# Patient Record
Sex: FEMALE | Race: WHITE | Employment: UNEMPLOYED | URBAN - METROPOLITAN AREA
[De-identification: names, ages, dates, MRNs, and addresses within clinical notes are randomized per-mention and may not be internally consistent; named-entity substitution may affect disease eponyms.]

---

## 2021-03-14 ENCOUNTER — HOSPITAL ENCOUNTER (EMERGENCY)
Age: 51
Discharge: HOME | End: 2021-03-14
Payer: COMMERCIAL

## 2021-03-14 ENCOUNTER — APPOINTMENT (OUTPATIENT)
Dept: GENERAL RADIOLOGY | Age: 51
End: 2021-03-14
Payer: COMMERCIAL

## 2021-03-14 VITALS
SYSTOLIC BLOOD PRESSURE: 125 MMHG | OXYGEN SATURATION: 96 % | BODY MASS INDEX: 17.93 KG/M2 | HEART RATE: 58 BPM | TEMPERATURE: 97.1 F | DIASTOLIC BLOOD PRESSURE: 73 MMHG | RESPIRATION RATE: 16 BRPM | WEIGHT: 105 LBS | HEIGHT: 64 IN

## 2021-03-14 DIAGNOSIS — M25.512 ACUTE PAIN OF LEFT SHOULDER: ICD-10-CM
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
## 2021-03-14 PROCEDURE — 99284 EMERGENCY DEPT VISIT MOD MDM: CPT

## 2021-03-14 PROCEDURE — 73030 X-RAY EXAM OF SHOULDER: CPT

## 2021-03-14 PROCEDURE — 6370000000 HC RX 637 (ALT 250 FOR IP): Performed by: PHYSICIAN ASSISTANT

## 2021-03-14 PROCEDURE — 73060 X-RAY EXAM OF HUMERUS: CPT

## 2021-03-14 RX ORDER — OXYCODONE HYDROCHLORIDE AND ACETAMINOPHEN 5; 325 MG/1; MG/1
1 TABLET ORAL ONCE
Status: COMPLETED | OUTPATIENT
Start: 2021-03-14 | End: 2021-03-14

## 2021-03-14 RX ORDER — HYDROCODONE BITARTRATE AND ACETAMINOPHEN 5; 325 MG/1; MG/1
1 TABLET ORAL EVERY 6 HOURS PRN
Qty: 12 TABLET | Refills: 0 | Status: SHIPPED | OUTPATIENT
Start: 2021-03-14 | End: 2021-03-17

## 2021-03-14 RX ORDER — IBUPROFEN 600 MG/1
600 TABLET ORAL EVERY 6 HOURS PRN
Qty: 40 TABLET | Refills: 0 | Status: SHIPPED | OUTPATIENT
Start: 2021-03-14 | End: 2021-05-21 | Stop reason: ALTCHOICE

## 2021-03-14 RX ADMIN — OXYCODONE HYDROCHLORIDE AND ACETAMINOPHEN 1 TABLET: 5; 325 TABLET ORAL at 15:04

## 2021-03-14 ASSESSMENT — PAIN DESCRIPTION - PAIN TYPE: TYPE: ACUTE PAIN

## 2021-03-14 ASSESSMENT — PAIN SCALES - GENERAL
PAINLEVEL_OUTOF10: 10
PAINLEVEL_OUTOF10: 10

## 2021-03-14 ASSESSMENT — PAIN DESCRIPTION - LOCATION: LOCATION: SHOULDER

## 2021-03-14 ASSESSMENT — PAIN DESCRIPTION - ORIENTATION: ORIENTATION: LEFT

## 2021-03-14 NOTE — ED PROVIDER NOTES
[facility]                                                                                                                                    Department of Emergency Medicine   ED  Provider Note  Admit Date/RoomTime: 3/14/2021  2:31 PM  ED Room: 32/32        HPI:  3/14/21,   Time: 3:00 PM EDT         Karlo Knott is a 45 y.o. female presenting to the ED for fall with left shoulder pain/injury, beginning 1 day ago. The complaint has been persistent, moderate in severity, and worsened by movement of left shoulder. The patient states she is in the process of moving to the area from [place]. She states that there is boxes everywhere and they have been busy unpacking. Last evening she states that she tripped over a box and landed on her left shoulder. The patient complains of severe pain at the site. She states that 6+ years ago she had surgery on that same shoulder in [place]. She reports that there is pins and screws in the joint. The patient reports severe pain. She states that she cannot move the shoulder at all without significant distress. Any movement aggravates her pain. She denies hitting her head or losing consciousness. Denies neck pain. No reported pain in the left elbow or wrist.  Denies numbness or tingling. Patient states she is not on any chronic pain meds.         ROS:     Constitutional: Negative for fever and chills  HENT: Negative for ear pain, sore throat and sinus pressure  Eyes: Negative for pain, discharge and redness  Respiratory:  Negative for shortness of breath, cough and wheezing  Cardiovascular: Negative for CP, edema or palpitations  Gastrointestinal: Negative for nausea, vomiting, diarrhea and abdominal distention  Genitourinary: Negative for dysuria and frequency  Musculoskeletal:  See HPI  Skin: Negative for rash and wound  Neurological: Negative for weakness and headaches  Hematological: Negative for adenopathy    All other systems reviewed and are negative      -------------------------------- PAST HISTORY ----------------------------------  Past Medical History:  has a past medical history of Cirrhosis ([address] Past Surgical History:  has a past surgical history that includes joint replacement (Left). Social History:  reports that she has been smoking. She has been smoking about 0.25 packs per day. She has never used smokeless tobacco. She reports current alcohol use of about 2.0 standard drinks of alcohol per week. She reports current drug use. Drug: Marijuana. Family History: family history is not on file. The patients home medications have been reviewed. Allergies: Patient has no known allergies. --------------------------------- RESULTS ------------------------------------------  All laboratory and radiology results have been personally reviewed by myself   LABS:  No results found for this visit on 03/14/21. RADIOLOGY:  Interpreted by Radiologist.  XR SHOULDER LEFT (MIN 2 VIEWS)   Final Result   No evidence of acute bony or surgical hardware complications. Subacromial ossifications may be related to chronic inflammatory process or   from prior trauma. XR HUMERUS LEFT (MIN 2 VIEWS)   Final Result   No acute bony pathology or surgical hardware complications.             ----------------- NURSING NOTES AND VITALS REVIEWED ---------------   The nursing notes within the ED encounter and vital signs as below have been reviewed. /73   Pulse 58   Temp 97.1 °F (36.2 °C) (Temporal)   Resp 16   Ht 5' 4\" (1.626 m)   Wt 105 lb (47.6 kg)   SpO2 96%   BMI 18.02 kg/m²   Oxygen Saturation Interpretation: Normal      --------------------------------PHYSICAL EXAM------------------------------------      Constitutional/General: Alert and oriented x3, tearful and upset.   Mild/moderate distress  Head: NC/AT  Eyes: PERRL, EOMI  Mouth: Oropharynx clear, handling secretions, no trismus  Neck: Supple, full ROM, no meningeal signs  Pulmonary: Lungs clear to auscultation bilaterally, no wheezes, rales, or rhonchi. Not in respiratory distress  Cardiovascular:  Regular rate and rhythm, no murmurs, gallops, or rubs. 2+ distal pulses  Extremities: Moves all extremities x 3. Exam of left UE negative for obvious trauma, deformity or erythema. She is markedly tender over proximal left humerus. Very limited exam secondary to pain. Unable to move shoulder at all without severe distress. No palpable tenderness over left elbow though any movement of left elbow increases pain in shoulder. Warm and well perfused  Skin: warm and dry without rash  Neurologic: GCS 15,  Intact. No focal deficits  Psych: Normal Affect      ------------------------ ED COURSE/MEDICAL DECISION MAKING----------------------  Medications   oxyCODONE-acetaminophen (PERCOCET) 5-325 MG per tablet 1 tablet (1 tablet Oral Given 3/14/21 8574)         Medical Decision Making:    No acute fracture or hardware failure identified. Pt will be placed in sling and advised to F/U with Ortho here locallyi. I'll allow small script for pain meds. She has negative OARRS report here but just moved to area. Counseling: The emergency provider has spoken with the patient and discussed todays results, in addition to providing specific details for the plan of care and counseling regarding the diagnosis and prognosis. Questions are answered at this time and they are agreeable with the plan.      ------------------------ IMPRESSION AND DISPOSITION -------------------------------    IMPRESSION  1. Fall, initial encounter    2.  Acute pain of left shoulder        DISPOSITION  Disposition: Discharge to home  Patient condition is stable                   Donya Contreras PA-C  03/14/21 8737

## 2021-05-21 ENCOUNTER — ANESTHESIA EVENT (OUTPATIENT)
Dept: OPERATING ROOM | Age: 51
DRG: 482 | End: 2021-05-21
Payer: COMMERCIAL

## 2021-05-21 ENCOUNTER — HOSPITAL ENCOUNTER (INPATIENT)
Age: 51
LOS: 3 days | Discharge: HOME | DRG: 482 | End: 2021-05-24
Attending: EMERGENCY MEDICINE | Admitting: INTERNAL MEDICINE
Payer: COMMERCIAL

## 2021-05-21 ENCOUNTER — APPOINTMENT (OUTPATIENT)
Dept: GENERAL RADIOLOGY | Age: 51
DRG: 482 | End: 2021-05-21
Payer: COMMERCIAL

## 2021-05-21 DIAGNOSIS — S72.002A CLOSED LEFT HIP FRACTURE, INITIAL ENCOUNTER (HCC): Primary | ICD-10-CM

## 2021-05-21 PROBLEM — S72.001A CLOSED RIGHT HIP FRACTURE, INITIAL ENCOUNTER (HCC): Status: ACTIVE | Noted: 2021-05-21

## 2021-05-21 PROBLEM — S72.142A CLOSED INTERTROCHANTERIC FRACTURE OF HIP, LEFT, INITIAL ENCOUNTER (HCC): Status: ACTIVE | Noted: 2021-05-21

## 2021-05-21 LAB
ABO/RH: NORMAL
ANION GAP SERPL CALCULATED.3IONS-SCNC: 11 MMOL/L (ref 7–16)
ANTIBODY SCREEN: NORMAL
BASOPHILS ABSOLUTE: 0.04 E9/L (ref 0–0.2)
BASOPHILS RELATIVE PERCENT: 0.9 % (ref 0–2)
BUN BLDV-MCNC: 7 MG/DL (ref 6–20)
CALCIUM SERPL-MCNC: 8.8 MG/DL (ref 8.6–10.2)
CHLORIDE BLD-SCNC: 106 MMOL/L (ref 98–107)
CO2: 25 MMOL/L (ref 22–29)
CREAT SERPL-MCNC: 0.5 MG/DL (ref 0.5–1)
EKG ATRIAL RATE: 71 BPM
EKG P AXIS: -3 DEGREES
EKG P-R INTERVAL: 126 MS
EKG Q-T INTERVAL: 434 MS
EKG QRS DURATION: 82 MS
EKG QTC CALCULATION (BAZETT): 471 MS
EKG R AXIS: 7 DEGREES
EKG T AXIS: 8 DEGREES
EKG VENTRICULAR RATE: 71 BPM
EOSINOPHILS ABSOLUTE: 0.06 E9/L (ref 0.05–0.5)
EOSINOPHILS RELATIVE PERCENT: 1.3 % (ref 0–6)
GFR AFRICAN AMERICAN: >60
GFR NON-AFRICAN AMERICAN: >60 ML/MIN/1.73
GLUCOSE BLD-MCNC: 78 MG/DL (ref 74–99)
HCT VFR BLD CALC: 36.8 % (ref 34–48)
HEMOGLOBIN: 12.5 G/DL (ref 11.5–15.5)
IMMATURE GRANULOCYTES #: 0.01 E9/L
IMMATURE GRANULOCYTES %: 0.2 % (ref 0–5)
LYMPHOCYTES ABSOLUTE: 1.39 E9/L (ref 1.5–4)
LYMPHOCYTES RELATIVE PERCENT: 30.5 % (ref 20–42)
MCH RBC QN AUTO: 35.9 PG (ref 26–35)
MCHC RBC AUTO-ENTMCNC: 34 % (ref 32–34.5)
MCV RBC AUTO: 105.7 FL (ref 80–99.9)
MONOCYTES ABSOLUTE: 0.46 E9/L (ref 0.1–0.95)
MONOCYTES RELATIVE PERCENT: 10.1 % (ref 2–12)
NEUTROPHILS ABSOLUTE: 2.59 E9/L (ref 1.8–7.3)
NEUTROPHILS RELATIVE PERCENT: 57 % (ref 43–80)
PDW BLD-RTO: 14.6 FL (ref 11.5–15)
PLATELET # BLD: 104 E9/L (ref 130–450)
PMV BLD AUTO: 10.4 FL (ref 7–12)
POTASSIUM SERPL-SCNC: 3.9 MMOL/L (ref 3.5–5)
RBC # BLD: 3.48 E12/L (ref 3.5–5.5)
SODIUM BLD-SCNC: 142 MMOL/L (ref 132–146)
WBC # BLD: 4.6 E9/L (ref 4.5–11.5)

## 2021-05-21 PROCEDURE — 86900 BLOOD TYPING SEROLOGIC ABO: CPT

## 2021-05-21 PROCEDURE — 73502 X-RAY EXAM HIP UNI 2-3 VIEWS: CPT

## 2021-05-21 PROCEDURE — 99253 IP/OBS CNSLTJ NEW/EST LOW 45: CPT | Performed by: ORTHOPAEDIC SURGERY

## 2021-05-21 PROCEDURE — 86901 BLOOD TYPING SEROLOGIC RH(D): CPT

## 2021-05-21 PROCEDURE — 86850 RBC ANTIBODY SCREEN: CPT

## 2021-05-21 PROCEDURE — 93010 ELECTROCARDIOGRAM REPORT: CPT | Performed by: INTERNAL MEDICINE

## 2021-05-21 PROCEDURE — 2580000003 HC RX 258: Performed by: ORTHOPAEDIC SURGERY

## 2021-05-21 PROCEDURE — 85025 COMPLETE CBC W/AUTO DIFF WBC: CPT

## 2021-05-21 PROCEDURE — 80048 BASIC METABOLIC PNL TOTAL CA: CPT

## 2021-05-21 PROCEDURE — 96374 THER/PROPH/DIAG INJ IV PUSH: CPT

## 2021-05-21 PROCEDURE — 93005 ELECTROCARDIOGRAM TRACING: CPT | Performed by: EMERGENCY MEDICINE

## 2021-05-21 PROCEDURE — 6360000002 HC RX W HCPCS: Performed by: ORTHOPAEDIC SURGERY

## 2021-05-21 PROCEDURE — 6360000002 HC RX W HCPCS: Performed by: EMERGENCY MEDICINE

## 2021-05-21 PROCEDURE — 1200000000 HC SEMI PRIVATE

## 2021-05-21 PROCEDURE — 99283 EMERGENCY DEPT VISIT LOW MDM: CPT

## 2021-05-21 PROCEDURE — 6370000000 HC RX 637 (ALT 250 FOR IP): Performed by: ORTHOPAEDIC SURGERY

## 2021-05-21 RX ORDER — FENTANYL CITRATE 50 UG/ML
25 INJECTION, SOLUTION INTRAMUSCULAR; INTRAVENOUS ONCE
Status: COMPLETED | OUTPATIENT
Start: 2021-05-21 | End: 2021-05-21

## 2021-05-21 RX ORDER — SODIUM CHLORIDE 0.9 % (FLUSH) 0.9 %
10 SYRINGE (ML) INJECTION PRN
Status: DISCONTINUED | OUTPATIENT
Start: 2021-05-21 | End: 2021-05-21

## 2021-05-21 RX ORDER — OXYCODONE HYDROCHLORIDE 5 MG/1
5 TABLET ORAL EVERY 4 HOURS PRN
Status: DISCONTINUED | OUTPATIENT
Start: 2021-05-21 | End: 2021-05-24 | Stop reason: HOSPADM

## 2021-05-21 RX ORDER — PROMETHAZINE HYDROCHLORIDE 25 MG/1
12.5 TABLET ORAL EVERY 6 HOURS PRN
Status: DISCONTINUED | OUTPATIENT
Start: 2021-05-21 | End: 2021-05-24 | Stop reason: HOSPADM

## 2021-05-21 RX ORDER — OXYCODONE HYDROCHLORIDE 5 MG/1
10 TABLET ORAL EVERY 4 HOURS PRN
Status: DISCONTINUED | OUTPATIENT
Start: 2021-05-21 | End: 2021-05-24 | Stop reason: HOSPADM

## 2021-05-21 RX ORDER — TRAZODONE HYDROCHLORIDE 150 MG/1
150 TABLET ORAL NIGHTLY
COMMUNITY

## 2021-05-21 RX ORDER — ONDANSETRON 2 MG/ML
4 INJECTION INTRAMUSCULAR; INTRAVENOUS EVERY 6 HOURS PRN
Status: DISCONTINUED | OUTPATIENT
Start: 2021-05-21 | End: 2021-05-24 | Stop reason: HOSPADM

## 2021-05-21 RX ORDER — SODIUM CHLORIDE 0.9 % (FLUSH) 0.9 %
5-40 SYRINGE (ML) INJECTION PRN
Status: DISCONTINUED | OUTPATIENT
Start: 2021-05-21 | End: 2021-05-24 | Stop reason: HOSPADM

## 2021-05-21 RX ORDER — MORPHINE SULFATE 4 MG/ML
4 INJECTION, SOLUTION INTRAMUSCULAR; INTRAVENOUS ONCE
Status: COMPLETED | OUTPATIENT
Start: 2021-05-21 | End: 2021-05-21

## 2021-05-21 RX ORDER — MORPHINE SULFATE 4 MG/ML
4 INJECTION, SOLUTION INTRAMUSCULAR; INTRAVENOUS
Status: DISCONTINUED | OUTPATIENT
Start: 2021-05-21 | End: 2021-05-24 | Stop reason: HOSPADM

## 2021-05-21 RX ORDER — BUPROPION HYDROCHLORIDE 150 MG/1
150 TABLET ORAL EVERY MORNING
COMMUNITY
End: 2021-11-08 | Stop reason: ALTCHOICE

## 2021-05-21 RX ORDER — SODIUM CHLORIDE 9 MG/ML
25 INJECTION, SOLUTION INTRAVENOUS PRN
Status: DISCONTINUED | OUTPATIENT
Start: 2021-05-21 | End: 2021-05-24 | Stop reason: HOSPADM

## 2021-05-21 RX ORDER — POLYETHYLENE GLYCOL 3350 17 G/17G
17 POWDER, FOR SOLUTION ORAL DAILY PRN
Status: DISCONTINUED | OUTPATIENT
Start: 2021-05-21 | End: 2021-05-24 | Stop reason: HOSPADM

## 2021-05-21 RX ORDER — MORPHINE SULFATE 2 MG/ML
2 INJECTION, SOLUTION INTRAMUSCULAR; INTRAVENOUS
Status: DISCONTINUED | OUTPATIENT
Start: 2021-05-21 | End: 2021-05-24 | Stop reason: HOSPADM

## 2021-05-21 RX ORDER — SODIUM CHLORIDE 0.9 % (FLUSH) 0.9 %
5-40 SYRINGE (ML) INJECTION EVERY 12 HOURS SCHEDULED
Status: DISCONTINUED | OUTPATIENT
Start: 2021-05-21 | End: 2021-05-22 | Stop reason: SDUPTHER

## 2021-05-21 RX ADMIN — MORPHINE SULFATE 4 MG: 4 INJECTION, SOLUTION INTRAMUSCULAR; INTRAVENOUS at 21:40

## 2021-05-21 RX ADMIN — ENOXAPARIN SODIUM 40 MG: 40 INJECTION SUBCUTANEOUS at 15:21

## 2021-05-21 RX ADMIN — OXYCODONE HYDROCHLORIDE 10 MG: 5 TABLET ORAL at 15:21

## 2021-05-21 RX ADMIN — MORPHINE SULFATE 4 MG: 4 INJECTION, SOLUTION INTRAMUSCULAR; INTRAVENOUS at 18:48

## 2021-05-21 RX ADMIN — MORPHINE SULFATE 4 MG: 4 INJECTION, SOLUTION INTRAMUSCULAR; INTRAVENOUS at 16:32

## 2021-05-21 RX ADMIN — Medication 10 ML: at 21:00

## 2021-05-21 RX ADMIN — FENTANYL CITRATE 25 MCG: 50 INJECTION, SOLUTION INTRAMUSCULAR; INTRAVENOUS at 12:47

## 2021-05-21 RX ADMIN — MORPHINE SULFATE 4 MG: 4 INJECTION, SOLUTION INTRAMUSCULAR; INTRAVENOUS at 13:45

## 2021-05-21 ASSESSMENT — ENCOUNTER SYMPTOMS
SINUS PRESSURE: 0
BACK PAIN: 0
EYE PAIN: 0
EYE DISCHARGE: 0
DIARRHEA: 0
COUGH: 0
EYE REDNESS: 0
ABDOMINAL DISTENTION: 0
VOMITING: 0
WHEEZING: 0
SORE THROAT: 0
NAUSEA: 0
SHORTNESS OF BREATH: 0
SHORTNESS OF BREATH: 0

## 2021-05-21 ASSESSMENT — PAIN DESCRIPTION - PAIN TYPE
TYPE: ACUTE PAIN

## 2021-05-21 ASSESSMENT — PAIN DESCRIPTION - ORIENTATION
ORIENTATION: LEFT
ORIENTATION: RIGHT

## 2021-05-21 ASSESSMENT — PAIN SCALES - GENERAL
PAINLEVEL_OUTOF10: 10
PAINLEVEL_OUTOF10: 4
PAINLEVEL_OUTOF10: 10

## 2021-05-21 ASSESSMENT — PAIN DESCRIPTION - DESCRIPTORS: DESCRIPTORS: SHARP;RADIATING

## 2021-05-21 ASSESSMENT — PAIN DESCRIPTION - LOCATION: LOCATION: HIP

## 2021-05-21 ASSESSMENT — PAIN DESCRIPTION - FREQUENCY: FREQUENCY: CONTINUOUS

## 2021-05-21 NOTE — PLAN OF CARE
Problem: Pain:  Goal: Pain level will decrease  Description: Pain level will decrease  Outcome: Met This Shift     Problem: Pain - Acute:  Goal: Pain level will decrease  Description: Pain level will decrease  Outcome: Met This Shift     Problem: Pain:  Goal: Control of acute pain  Description: Control of acute pain  Outcome: Ongoing  Goal: Control of chronic pain  Description: Control of chronic pain  Outcome: Ongoing     Problem: Skin Integrity:  Goal: Will show no infection signs and symptoms  Description: Will show no infection signs and symptoms  Outcome: Ongoing  Goal: Absence of new skin breakdown  Description: Absence of new skin breakdown  Outcome: Ongoing     Problem: Falls - Risk of:  Goal: Will remain free from falls  Description: Will remain free from falls  Outcome: Ongoing  Goal: Absence of physical injury  Description: Absence of physical injury  Outcome: Ongoing     Problem: Discharge Planning:  Goal: Knowledge of discharge instructions  Description: Knowledge of discharge instructions  Outcome: Ongoing     Problem: Infection - Surgical Site:  Goal: Signs of wound healing will improve  Description: Signs of wound healing will improve  Outcome: Ongoing     Problem: Mobility - Impaired:  Goal: Achieve maximum mobility level  Description: Achieve maximum mobility level  Outcome: Ongoing

## 2021-05-21 NOTE — ED NOTES
Bed:  GALAVIZ-  Expected date:   Expected time:   Means of arrival:   Comments:  Reta Maher RN  05/21/21 0153
[facility] faxed, spoke to Birgit Dietz, confirmation received. Pt chimed.       Melo Michele, MATEO  05/21/21 1984
30-Nov-2019 06:34

## 2021-05-21 NOTE — ED PROVIDER NOTES
Head: Normocephalic and atraumatic. Nose: Nose normal.   Eyes:      Extraocular Movements: Extraocular movements intact. Pupils: Pupils are equal, round, and reactive to light. Cardiovascular:      Rate and Rhythm: Normal rate and regular rhythm. Pulses: Normal pulses. Heart sounds: Normal heart sounds. Pulmonary:      Effort: Pulmonary effort is normal.      Breath sounds: Normal breath sounds. Abdominal:      General: Abdomen is flat. Bowel sounds are normal.      Palpations: Abdomen is soft. Musculoskeletal:      Left hip: Deformity, tenderness and bony tenderness present. Neurological:      Mental Status: She is alert. Procedures     MDM  Number of Diagnoses or Management Options  Diagnosis management comments: Patient seen and examined. Patient sent over with evidence of left hip fracture. Spoke with Dr. Radha Mccann who will admit patient consult placed to Dr. Twin Paez for orthopedics. ED Course as of May 21 1317   Fri May 21, 2021   1316 EKG: This EKG is signed and interpreted by the EP. Time: 13:15  Rate: 71  Rhythm: Sinus  Interpretation: NSR  Comparison: was normal      [CF]      ED Course User Index  [CF] Destiny , DO        --------------------------------------------- PAST HISTORY ---------------------------------------------  Past Medical History:  has a past medical history of Cirrhosis ([facility] [address] Past Surgical History:  has a past surgical history that includes joint replacement (Left). Social History:  reports that she has been smoking. She has been smoking about 0.25 packs per day. She has never used smokeless tobacco. She reports current alcohol use of about 2.0 standard drinks of alcohol per week. She reports current drug use. Drug: Marijuana. Family History: family history is not on file. The patients home medications have been reviewed. Allergies: Patient has no known allergies.     -------------------------------------------------- specific details for the plan of care and counseling regarding the diagnosis and prognosis. Their questions are answered at this time and they are agreeable with the plan of admission.    --------------------------------- ADDITIONAL PROVIDER NOTES ---------------------------------  This patient's ED course included: a personal history and physicial examination, re-evaluation prior to disposition, multiple bedside re-evaluations, IV medications, cardiac monitoring and continuous pulse oximetry    This patient has remained hemodynamically stable during their ED course. Diagnosis:  1. Closed left hip fracture, initial encounter [facility])        Disposition:  Patient's disposition: Admit to med/surg floor  Patient's condition is fair.            Eros Mccarty DO  05/21/21 1318

## 2021-05-21 NOTE — ANESTHESIA PRE PROCEDURE
Department of Anesthesiology  Preprocedure Note       Name:  Bibi Gonzalez   Age:  45 y.o.  :  1970                                          MRN:  [id number]         Date:  2021      Surgeon: Celestine Cronin):  Jordan Alegria MD    Procedure: Procedure(s):  LEFT HIP OPEN REDUCTION INTERNAL FIXATION   +++SYNTHES+++  PLATE SCREWS CANNULATED SCREW FEMORAL NECK SYSTEM TROCHANTERIC CLAW PROBABLE SHORT TSN    Medications prior to admission:   Prior to Admission medications    Medication Sig Start Date End Date Taking?  Authorizing Provider   traZODone (DESYREL) 150 MG tablet Take 150 mg by mouth nightly   Yes Historical Provider, MD   buPROPion (WELLBUTRIN XL) 150 MG extended release tablet Take 150 mg by mouth every morning   Yes Historical Provider, MD       Current medications:    Current Facility-Administered Medications   Medication Dose Route Frequency Provider Last Rate Last Admin    sodium chloride flush 0.9 % injection 5-40 mL  5-40 mL Intravenous 2 times per day Jordan Alegria MD        sodium chloride flush 0.9 % injection 5-40 mL  5-40 mL Intravenous PRN Jordan Alegria MD        0.9 % sodium chloride infusion  25 mL Intravenous PRN Jordan Alegria MD        enoxaparin (LOVENOX) injection 40 mg  40 mg Subcutaneous Daily Jordan Alegria MD   40 mg at 21 1521    promethazine (PHENERGAN) tablet 12.5 mg  12.5 mg Oral Q6H PRN Jordan Alegria MD        Or    [facility] injection 4 mg  4 mg Intravenous Q6H PRN Jordan Alegria MD        polyethylene glycol [facility]) packet 17 g  17 g Oral Daily PRN Jordan Alegria MD        ceFAZolin (ANCEF) 2000 mg in sterile water 20 mL IV syringe  2,000 mg Intravenous See Admin Instructions Jordan Alegria MD        oxyCODONE (ROXICODONE) immediate release tablet 5 mg  5 mg Oral Q4H PRN Jordan Alegria MD        Or    oxyCODONE (ROXICODONE) immediate release tablet 10 mg  10 mg Oral Q4H PRN Jordan Alegria MD   10 mg at 21 1521    Lab Results   Component Value Date     05/21/2021    K 3.9 05/21/2021     05/21/2021    CO2 25 05/21/2021    BUN 7 05/21/2021    CREATININE 0.5 05/21/2021    GFRAA >60 05/21/2021    LABGLOM >60 05/21/2021    GLUCOSE 78 05/21/2021    CALCIUM 8.8 05/21/2021       POC Tests: No results for input(s): POCGLU, POCNA, POCK, POCCL, POCBUN, POCHEMO, POCHCT in the last 72 hours. Coags: No results found for: PROTIME, INR, APTT    HCG (If Applicable): No results found for: PREGTESTUR, PREGSERUM, HCG, HCGQUANT     ABGs: No results found for: PHART, PO2ART, JFN1HHC, GPA8NFC, BEART, J6MTCNLM     Type & Screen (If Applicable):  No results found for: LABABO, LABRH    Drug/Infectious Status (If Applicable):  No results found for: HIV, HEPCAB    COVID-19 Screening (If Applicable): No results found for: COVID19        Anesthesia Evaluation  Patient summary reviewed no history of anesthetic complications:   Airway: Mallampati: II  TM distance: >3 FB   Neck ROM: full  Mouth opening: > = 3 FB Dental: normal exam         Pulmonary: breath sounds clear to auscultation  (+) current smoker    (-) shortness of breath                          ROS comment: MARIJUANA USE   Cardiovascular:Negative CV ROS        (-) past MI and CAD    ECG reviewed  Rhythm: regular  Rate: normal                    Neuro/Psych:   Negative Neuro/Psych ROS              GI/Hepatic/Renal:   (+) liver disease (S/P TIPS Procedure): portal hypertension,           Endo/Other:    (+) blood dyscrasia: thrombocytopenia:., .    (-) diabetes mellitus, hypothyroidism               Abdominal:         (-) obese     Vascular: negative vascular ROS. Anesthesia Plan      general     ASA 3       Induction: intravenous. BIS  MIPS: Postoperative opioids intended and Prophylactic antiemetics administered. Anesthetic plan and risks discussed with patient. Use of blood products discussed with patient whom consented to blood products.

## 2021-05-21 NOTE — Clinical Note
Patient Class: Inpatient [101]   REQUIRED: Diagnosis: Closed right hip fracture, initial encounter [facility]) [[id number]]   Estimated Length of Stay: Estimated stay of more than 2 midnights   Admitting Provider: Terri Gallagher [[id number]]   Telemetry/Cardiac Monitoring Required?: No

## 2021-05-22 ENCOUNTER — ANESTHESIA (OUTPATIENT)
Dept: OPERATING ROOM | Age: 51
DRG: 482 | End: 2021-05-22
Payer: COMMERCIAL

## 2021-05-22 ENCOUNTER — APPOINTMENT (OUTPATIENT)
Dept: GENERAL RADIOLOGY | Age: 51
DRG: 482 | End: 2021-05-22
Payer: COMMERCIAL

## 2021-05-22 VITALS — SYSTOLIC BLOOD PRESSURE: 113 MMHG | OXYGEN SATURATION: 94 % | TEMPERATURE: 96.8 F | DIASTOLIC BLOOD PRESSURE: 59 MMHG

## 2021-05-22 PROBLEM — K74.60 CIRRHOSIS (HCC): Status: ACTIVE | Noted: 2021-05-22

## 2021-05-22 LAB
ALBUMIN SERPL-MCNC: 4 G/DL (ref 3.5–5.2)
ALP BLD-CCNC: 169 U/L (ref 35–104)
ALT SERPL-CCNC: 18 U/L (ref 0–32)
ANION GAP SERPL CALCULATED.3IONS-SCNC: 12 MMOL/L (ref 7–16)
ANION GAP SERPL CALCULATED.3IONS-SCNC: 12 MMOL/L (ref 7–16)
AST SERPL-CCNC: 51 U/L (ref 0–31)
BILIRUB SERPL-MCNC: 1.5 MG/DL (ref 0–1.2)
BUN BLDV-MCNC: 7 MG/DL (ref 6–20)
BUN BLDV-MCNC: 7 MG/DL (ref 6–20)
CALCIUM SERPL-MCNC: 9.1 MG/DL (ref 8.6–10.2)
CALCIUM SERPL-MCNC: 9.1 MG/DL (ref 8.6–10.2)
CHLORIDE BLD-SCNC: 96 MMOL/L (ref 98–107)
CHLORIDE BLD-SCNC: 98 MMOL/L (ref 98–107)
CO2: 25 MMOL/L (ref 22–29)
CO2: 25 MMOL/L (ref 22–29)
CREAT SERPL-MCNC: 0.4 MG/DL (ref 0.5–1)
CREAT SERPL-MCNC: 0.4 MG/DL (ref 0.5–1)
GFR AFRICAN AMERICAN: >60
GFR AFRICAN AMERICAN: >60
GFR NON-AFRICAN AMERICAN: >60 ML/MIN/1.73
GFR NON-AFRICAN AMERICAN: >60 ML/MIN/1.73
GLUCOSE BLD-MCNC: 73 MG/DL (ref 74–99)
GLUCOSE BLD-MCNC: 75 MG/DL (ref 74–99)
HCT VFR BLD CALC: 39.9 % (ref 34–48)
HEMOGLOBIN: 12.8 G/DL (ref 11.5–15.5)
INR BLD: 1.3
MCH RBC QN AUTO: 34.3 PG (ref 26–35)
MCHC RBC AUTO-ENTMCNC: 32.1 % (ref 32–34.5)
MCV RBC AUTO: 107 FL (ref 80–99.9)
PDW BLD-RTO: 14 FL (ref 11.5–15)
PLATELET # BLD: 105 E9/L (ref 130–450)
PMV BLD AUTO: 10.2 FL (ref 7–12)
POTASSIUM REFLEX MAGNESIUM: 3.9 MMOL/L (ref 3.5–5)
POTASSIUM SERPL-SCNC: 4 MMOL/L (ref 3.5–5)
PROTHROMBIN TIME: 14.1 SEC (ref 9.3–12.4)
RBC # BLD: 3.73 E12/L (ref 3.5–5.5)
SODIUM BLD-SCNC: 133 MMOL/L (ref 132–146)
SODIUM BLD-SCNC: 135 MMOL/L (ref 132–146)
TOTAL PROTEIN: 7.2 G/DL (ref 6.4–8.3)
WBC # BLD: 5.3 E9/L (ref 4.5–11.5)

## 2021-05-22 PROCEDURE — 6360000002 HC RX W HCPCS: Performed by: ANESTHESIOLOGY

## 2021-05-22 PROCEDURE — 3700000000 HC ANESTHESIA ATTENDED CARE: Performed by: ORTHOPAEDIC SURGERY

## 2021-05-22 PROCEDURE — 80048 BASIC METABOLIC PNL TOTAL CA: CPT

## 2021-05-22 PROCEDURE — 6370000000 HC RX 637 (ALT 250 FOR IP): Performed by: ORTHOPAEDIC SURGERY

## 2021-05-22 PROCEDURE — C1769 GUIDE WIRE: HCPCS | Performed by: ORTHOPAEDIC SURGERY

## 2021-05-22 PROCEDURE — 2709999900 HC NON-CHARGEABLE SUPPLY: Performed by: ORTHOPAEDIC SURGERY

## 2021-05-22 PROCEDURE — 87081 CULTURE SCREEN ONLY: CPT

## 2021-05-22 PROCEDURE — 80053 COMPREHEN METABOLIC PANEL: CPT

## 2021-05-22 PROCEDURE — 3700000001 HC ADD 15 MINUTES (ANESTHESIA): Performed by: ORTHOPAEDIC SURGERY

## 2021-05-22 PROCEDURE — 2720000010 HC SURG SUPPLY STERILE: Performed by: ORTHOPAEDIC SURGERY

## 2021-05-22 PROCEDURE — 7100000000 HC PACU RECOVERY - FIRST 15 MIN: Performed by: ORTHOPAEDIC SURGERY

## 2021-05-22 PROCEDURE — 3209999900 FLUORO FOR SURGICAL PROCEDURES

## 2021-05-22 PROCEDURE — 27245 TREAT THIGH FRACTURE: CPT | Performed by: ORTHOPAEDIC SURGERY

## 2021-05-22 PROCEDURE — 51798 US URINE CAPACITY MEASURE: CPT

## 2021-05-22 PROCEDURE — 0QS704Z REPOSITION LEFT UPPER FEMUR WITH INTERNAL FIXATION DEVICE, OPEN APPROACH: ICD-10-PCS | Performed by: ORTHOPAEDIC SURGERY

## 2021-05-22 PROCEDURE — 2700000000 HC OXYGEN THERAPY PER DAY

## 2021-05-22 PROCEDURE — 85027 COMPLETE CBC AUTOMATED: CPT

## 2021-05-22 PROCEDURE — 85610 PROTHROMBIN TIME: CPT

## 2021-05-22 PROCEDURE — 1200000000 HC SEMI PRIVATE

## 2021-05-22 PROCEDURE — 6360000002 HC RX W HCPCS: Performed by: NURSE ANESTHETIST, CERTIFIED REGISTERED

## 2021-05-22 PROCEDURE — 73501 X-RAY EXAM HIP UNI 1 VIEW: CPT

## 2021-05-22 PROCEDURE — C1713 ANCHOR/SCREW BN/BN,TIS/BN: HCPCS | Performed by: ORTHOPAEDIC SURGERY

## 2021-05-22 PROCEDURE — 51701 INSERT BLADDER CATHETER: CPT

## 2021-05-22 PROCEDURE — C1776 JOINT DEVICE (IMPLANTABLE): HCPCS | Performed by: ORTHOPAEDIC SURGERY

## 2021-05-22 PROCEDURE — 36415 COLL VENOUS BLD VENIPUNCTURE: CPT

## 2021-05-22 PROCEDURE — 6360000002 HC RX W HCPCS: Performed by: ORTHOPAEDIC SURGERY

## 2021-05-22 PROCEDURE — 2500000003 HC RX 250 WO HCPCS: Performed by: NURSE ANESTHETIST, CERTIFIED REGISTERED

## 2021-05-22 PROCEDURE — 2580000003 HC RX 258: Performed by: NURSE ANESTHETIST, CERTIFIED REGISTERED

## 2021-05-22 PROCEDURE — 3600000013 HC SURGERY LEVEL 3 ADDTL 15MIN: Performed by: ORTHOPAEDIC SURGERY

## 2021-05-22 PROCEDURE — 7100000001 HC PACU RECOVERY - ADDTL 15 MIN: Performed by: ORTHOPAEDIC SURGERY

## 2021-05-22 PROCEDURE — 2580000003 HC RX 258: Performed by: ORTHOPAEDIC SURGERY

## 2021-05-22 PROCEDURE — 3600000003 HC SURGERY LEVEL 3 BASE: Performed by: ORTHOPAEDIC SURGERY

## 2021-05-22 DEVICE — SCREW BNE LCK 5X30 MM W/ T25 STARDRV FOR IM NAIL TI STRL: Type: IMPLANTABLE DEVICE | Site: HIP | Status: FUNCTIONAL

## 2021-05-22 DEVICE — SCREW BNE L85MM DIA10.35MM G TI CANN PERF FOR PROX FEM: Type: IMPLANTABLE DEVICE | Site: HIP | Status: FUNCTIONAL

## 2021-05-22 DEVICE — NAIL IM L170MM DIA9MM 125DEG SHT GRN PROX FEM TI: Type: IMPLANTABLE DEVICE | Site: HIP | Status: FUNCTIONAL

## 2021-05-22 RX ORDER — SODIUM CHLORIDE 9 MG/ML
INJECTION, SOLUTION INTRAVENOUS CONTINUOUS PRN
Status: DISCONTINUED | OUTPATIENT
Start: 2021-05-22 | End: 2021-05-22 | Stop reason: SDUPTHER

## 2021-05-22 RX ORDER — TRAZODONE HYDROCHLORIDE 150 MG/1
150 TABLET ORAL NIGHTLY
Status: DISCONTINUED | OUTPATIENT
Start: 2021-05-22 | End: 2021-05-24 | Stop reason: HOSPADM

## 2021-05-22 RX ORDER — PHENYLEPHRINE HCL IN 0.9% NACL 1 MG/10 ML
SYRINGE (ML) INTRAVENOUS PRN
Status: DISCONTINUED | OUTPATIENT
Start: 2021-05-22 | End: 2021-05-22 | Stop reason: SDUPTHER

## 2021-05-22 RX ORDER — GLYCOPYRROLATE 1 MG/5 ML
SYRINGE (ML) INTRAVENOUS PRN
Status: DISCONTINUED | OUTPATIENT
Start: 2021-05-22 | End: 2021-05-22 | Stop reason: SDUPTHER

## 2021-05-22 RX ORDER — FENTANYL CITRATE 50 UG/ML
25 INJECTION, SOLUTION INTRAMUSCULAR; INTRAVENOUS EVERY 5 MIN PRN
Status: DISCONTINUED | OUTPATIENT
Start: 2021-05-22 | End: 2021-05-22 | Stop reason: HOSPADM

## 2021-05-22 RX ORDER — OXYCODONE HYDROCHLORIDE AND ACETAMINOPHEN 5; 325 MG/1; MG/1
1 TABLET ORAL EVERY 6 HOURS PRN
Qty: 28 TABLET | Refills: 0 | Status: SHIPPED | OUTPATIENT
Start: 2021-05-22 | End: 2021-06-04 | Stop reason: SDUPTHER

## 2021-05-22 RX ORDER — SODIUM CHLORIDE 0.9 % (FLUSH) 0.9 %
5-40 SYRINGE (ML) INJECTION EVERY 12 HOURS SCHEDULED
Status: DISCONTINUED | OUTPATIENT
Start: 2021-05-22 | End: 2021-05-24 | Stop reason: HOSPADM

## 2021-05-22 RX ORDER — DEXAMETHASONE SODIUM PHOSPHATE 4 MG/ML
INJECTION, SOLUTION INTRA-ARTICULAR; INTRALESIONAL; INTRAMUSCULAR; INTRAVENOUS; SOFT TISSUE PRN
Status: DISCONTINUED | OUTPATIENT
Start: 2021-05-22 | End: 2021-05-22 | Stop reason: SDUPTHER

## 2021-05-22 RX ORDER — ROCURONIUM BROMIDE 10 MG/ML
INJECTION, SOLUTION INTRAVENOUS PRN
Status: DISCONTINUED | OUTPATIENT
Start: 2021-05-22 | End: 2021-05-22 | Stop reason: SDUPTHER

## 2021-05-22 RX ORDER — MIDAZOLAM HYDROCHLORIDE 1 MG/ML
INJECTION INTRAMUSCULAR; INTRAVENOUS PRN
Status: DISCONTINUED | OUTPATIENT
Start: 2021-05-22 | End: 2021-05-22 | Stop reason: SDUPTHER

## 2021-05-22 RX ORDER — CEFAZOLIN SODIUM 1 G/3ML
INJECTION, POWDER, FOR SOLUTION INTRAMUSCULAR; INTRAVENOUS PRN
Status: DISCONTINUED | OUTPATIENT
Start: 2021-05-22 | End: 2021-05-22 | Stop reason: SDUPTHER

## 2021-05-22 RX ORDER — LIDOCAINE HYDROCHLORIDE 20 MG/ML
INJECTION, SOLUTION EPIDURAL; INFILTRATION; INTRACAUDAL; PERINEURAL PRN
Status: DISCONTINUED | OUTPATIENT
Start: 2021-05-22 | End: 2021-05-22 | Stop reason: SDUPTHER

## 2021-05-22 RX ORDER — ASPIRIN 81 MG/1
81 TABLET ORAL 2 TIMES DAILY
Qty: 56 TABLET | Refills: 0 | Status: SHIPPED | OUTPATIENT
Start: 2021-05-22 | End: 2021-11-08 | Stop reason: ALTCHOICE

## 2021-05-22 RX ORDER — FENTANYL CITRATE 50 UG/ML
INJECTION, SOLUTION INTRAMUSCULAR; INTRAVENOUS PRN
Status: DISCONTINUED | OUTPATIENT
Start: 2021-05-22 | End: 2021-05-22 | Stop reason: SDUPTHER

## 2021-05-22 RX ORDER — NEOSTIGMINE METHYLSULFATE 1 MG/ML
INJECTION, SOLUTION INTRAVENOUS PRN
Status: DISCONTINUED | OUTPATIENT
Start: 2021-05-22 | End: 2021-05-22 | Stop reason: SDUPTHER

## 2021-05-22 RX ORDER — SENNA AND DOCUSATE SODIUM 50; 8.6 MG/1; MG/1
1 TABLET, FILM COATED ORAL 2 TIMES DAILY
Status: DISCONTINUED | OUTPATIENT
Start: 2021-05-22 | End: 2021-05-24 | Stop reason: HOSPADM

## 2021-05-22 RX ORDER — PROPOFOL 10 MG/ML
INJECTION, EMULSION INTRAVENOUS PRN
Status: DISCONTINUED | OUTPATIENT
Start: 2021-05-22 | End: 2021-05-22 | Stop reason: SDUPTHER

## 2021-05-22 RX ADMIN — DOCUSATE SODIUM 50 MG AND SENNOSIDES 8.6 MG 1 TABLET: 8.6; 5 TABLET, FILM COATED ORAL at 22:54

## 2021-05-22 RX ADMIN — MORPHINE SULFATE 4 MG: 4 INJECTION, SOLUTION INTRAMUSCULAR; INTRAVENOUS at 00:47

## 2021-05-22 RX ADMIN — OXYCODONE HYDROCHLORIDE 10 MG: 5 TABLET ORAL at 22:54

## 2021-05-22 RX ADMIN — HYDROMORPHONE HYDROCHLORIDE 0.5 MG: 1 INJECTION, SOLUTION INTRAMUSCULAR; INTRAVENOUS; SUBCUTANEOUS at 18:23

## 2021-05-22 RX ADMIN — Medication 10 ML: at 10:00

## 2021-05-22 RX ADMIN — HYDROMORPHONE HYDROCHLORIDE 0.5 MG: 1 INJECTION, SOLUTION INTRAMUSCULAR; INTRAVENOUS; SUBCUTANEOUS at 18:35

## 2021-05-22 RX ADMIN — CEFAZOLIN 2000 MG: 1 INJECTION, POWDER, FOR SOLUTION INTRAMUSCULAR; INTRAVENOUS at 17:19

## 2021-05-22 RX ADMIN — Medication 3 MG: at 18:02

## 2021-05-22 RX ADMIN — PROPOFOL 200 MG: 10 INJECTION, EMULSION INTRAVENOUS at 17:05

## 2021-05-22 RX ADMIN — MORPHINE SULFATE 4 MG: 4 INJECTION, SOLUTION INTRAMUSCULAR; INTRAVENOUS at 05:21

## 2021-05-22 RX ADMIN — SODIUM CHLORIDE: 9 INJECTION, SOLUTION INTRAVENOUS at 16:56

## 2021-05-22 RX ADMIN — Medication 0.6 MG: at 18:02

## 2021-05-22 RX ADMIN — MORPHINE SULFATE 4 MG: 4 INJECTION, SOLUTION INTRAMUSCULAR; INTRAVENOUS at 09:50

## 2021-05-22 RX ADMIN — DEXAMETHASONE SODIUM PHOSPHATE 10 MG: 4 INJECTION, SOLUTION INTRAMUSCULAR; INTRAVENOUS at 17:13

## 2021-05-22 RX ADMIN — LIDOCAINE HYDROCHLORIDE 100 MG: 20 INJECTION, SOLUTION EPIDURAL; INFILTRATION; INTRACAUDAL; PERINEURAL at 17:05

## 2021-05-22 RX ADMIN — MIDAZOLAM 2 MG: 1 INJECTION INTRAMUSCULAR; INTRAVENOUS at 16:56

## 2021-05-22 RX ADMIN — FENTANYL CITRATE 50 MCG: 50 INJECTION, SOLUTION INTRAMUSCULAR; INTRAVENOUS at 17:33

## 2021-05-22 RX ADMIN — Medication 2000 MG: at 17:19

## 2021-05-22 RX ADMIN — SODIUM CHLORIDE, PRESERVATIVE FREE 10 ML: 5 INJECTION INTRAVENOUS at 05:22

## 2021-05-22 RX ADMIN — FENTANYL CITRATE 50 MCG: 50 INJECTION, SOLUTION INTRAMUSCULAR; INTRAVENOUS at 17:04

## 2021-05-22 RX ADMIN — Medication 100 MCG: at 17:48

## 2021-05-22 RX ADMIN — Medication 100 MCG: at 17:16

## 2021-05-22 RX ADMIN — HYDROMORPHONE HYDROCHLORIDE 0.5 MG: 1 INJECTION, SOLUTION INTRAMUSCULAR; INTRAVENOUS; SUBCUTANEOUS at 18:47

## 2021-05-22 RX ADMIN — HYDROMORPHONE HYDROCHLORIDE 0.5 MG: 1 INJECTION, SOLUTION INTRAMUSCULAR; INTRAVENOUS; SUBCUTANEOUS at 18:17

## 2021-05-22 RX ADMIN — MORPHINE SULFATE 4 MG: 4 INJECTION, SOLUTION INTRAMUSCULAR; INTRAVENOUS at 20:29

## 2021-05-22 RX ADMIN — ROCURONIUM BROMIDE 30 MG: 10 INJECTION, SOLUTION INTRAVENOUS at 17:06

## 2021-05-22 ASSESSMENT — PULMONARY FUNCTION TESTS
PIF_VALUE: 8
PIF_VALUE: 16
PIF_VALUE: 15
PIF_VALUE: 16
PIF_VALUE: 9
PIF_VALUE: 17
PIF_VALUE: 15
PIF_VALUE: 1
PIF_VALUE: 15
PIF_VALUE: 16
PIF_VALUE: 15
PIF_VALUE: 16
PIF_VALUE: 16
PIF_VALUE: 1
PIF_VALUE: 16
PIF_VALUE: 15
PIF_VALUE: 17
PIF_VALUE: 2
PIF_VALUE: 2
PIF_VALUE: 16
PIF_VALUE: 1
PIF_VALUE: 4
PIF_VALUE: 15
PIF_VALUE: 16
PIF_VALUE: 15
PIF_VALUE: 15
PIF_VALUE: 16
PIF_VALUE: 2
PIF_VALUE: 16
PIF_VALUE: 17
PIF_VALUE: 15
PIF_VALUE: 16
PIF_VALUE: 15
PIF_VALUE: 1
PIF_VALUE: 16
PIF_VALUE: 15
PIF_VALUE: 16
PIF_VALUE: 15
PIF_VALUE: 14
PIF_VALUE: 14
PIF_VALUE: 15

## 2021-05-22 ASSESSMENT — PAIN DESCRIPTION - ORIENTATION
ORIENTATION: LEFT

## 2021-05-22 ASSESSMENT — PAIN DESCRIPTION - PROGRESSION
CLINICAL_PROGRESSION: GRADUALLY IMPROVING
CLINICAL_PROGRESSION: RAPIDLY IMPROVING

## 2021-05-22 ASSESSMENT — PAIN SCALES - GENERAL
PAINLEVEL_OUTOF10: 8
PAINLEVEL_OUTOF10: 6
PAINLEVEL_OUTOF10: 8
PAINLEVEL_OUTOF10: 6
PAINLEVEL_OUTOF10: 6
PAINLEVEL_OUTOF10: 8
PAINLEVEL_OUTOF10: 9
PAINLEVEL_OUTOF10: 8

## 2021-05-22 ASSESSMENT — PAIN DESCRIPTION - DESCRIPTORS
DESCRIPTORS: ACHING;CONSTANT
DESCRIPTORS: DISCOMFORT
DESCRIPTORS: DISCOMFORT
DESCRIPTORS: DISCOMFORT;SHARP;SORE
DESCRIPTORS: DISCOMFORT

## 2021-05-22 ASSESSMENT — PAIN DESCRIPTION - LOCATION
LOCATION: HIP

## 2021-05-22 ASSESSMENT — PAIN DESCRIPTION - PAIN TYPE
TYPE: ACUTE PAIN
TYPE: SURGICAL PAIN
TYPE: SURGICAL PAIN

## 2021-05-22 ASSESSMENT — PAIN DESCRIPTION - ONSET
ONSET: ON-GOING
ONSET: ON-GOING

## 2021-05-22 ASSESSMENT — PAIN - FUNCTIONAL ASSESSMENT
PAIN_FUNCTIONAL_ASSESSMENT: PREVENTS OR INTERFERES SOME ACTIVE ACTIVITIES AND ADLS
PAIN_FUNCTIONAL_ASSESSMENT: ACTIVITIES ARE NOT PREVENTED

## 2021-05-22 ASSESSMENT — PAIN DESCRIPTION - FREQUENCY: FREQUENCY: CONTINUOUS

## 2021-05-22 ASSESSMENT — LIFESTYLE VARIABLES: SMOKING_STATUS: 1

## 2021-05-22 NOTE — OP NOTE
Operative Note      Patient: Barbra Roth  YOB: 1970  MRN: [id number]    Date of Procedure: 5/22/2021    Pre-Op Diagnosis: left hip fracture    Post-Op Diagnosis: Same       Procedure(s):  LEFT HIP OPEN REDUCTION INTERNAL FIXATION    Surgeon(s):  Rio Littlejohn MD    Assistant:   * No surgical staff found *    Anesthesia: General    Estimated Blood Loss (mL): less than 898     Complications: None    Specimens:   * No specimens in log *    Implants:  Implant Name Type Inv. Item Serial No.  Lot No. LRB No. Used Action   NAIL IM L170MM DIA9MM 125DEG SHT GRN PROX FEM TI  NAIL IM L170MM DIA9MM 125DEG SHT GRN PROX FEM TI  DEPUY SYNTHES [place]-WD [id number] Left 1 Implanted   SCREW BNE L85MM DIA10.35MM G TI [place] PERF FOR PROX FEM  SCREW BNE L85MM DIA10.35MM G TI [place] PERF FOR PROX FEM  DEPUY SYNTHES [place]-WD [id number] Left 1 Implanted   SCREW BNE LCK 5X30 MM W/ T25 STARDRV FOR IM NAIL TI STRL  SCREW BNE LCK 5X30 MM W/ T25 STARDRV FOR IM NAIL TI STRL  DEPUY SYNTHES [place]-WD [id number] Left 1 Implanted         Drains: * No LDAs found *    Findings: see details    Detailed Description of Procedure:   DATE OF PROCEDURE: 5/22/2021       PREOPERATIVE DIAGNOSIS:  Left intertrochanteric hip fracture.     POSTOPERATIVE DIAGNOSIS:  left intertrochanteric hip fracture.     PROCEDURE PERFORMED:  left intertrochanteric hip fracture open reduction  and internal fixation using a Synthes trochanteric femoral nail 9 mm in  diameter, 85 mm lag screw with a 125-degree angled implant.     ANESTHESIA:  General.     ASSISTANT:  none     COMPLICATIONS:  None.     DISPOSITION:  The patient was stable throughout the procedure.     OPERATIVE INDICATIONS:  The patient sustained a hip fracture and was admitted to the Medical Service and medically cleared for surgical intervention. The risks,  benefits, alternatives, and complications were fully outlined and explained  to the patient as well as the family.   These included, but not limited to the risks  of infection; damage to nerves, vessels, or tendons; malunion; nonunion;  symptomatic hardware; hardware failure; risk of deep venous thrombosis,  pulmonary emboli, as well as the increased risk of perioperative morbidity  and mortality. They understood and wished to proceed.     SURGERY IN DETAIL:  The patient was identified in the holding area. The  left hip was identified as the surgical site. The patient was then seen by  Anesthesia, taken to the operating room, placed supine on the table, and  underwent general anesthesia per the Anesthesia Department. The patient  was transferred to a well-padded skeletal traction table, and both legs  were placed into well-padded traction boots. The nonoperative leg was extended and  the operative leg was placed into traction under fluoroscopic imaging. AP and  lateral views confirmed hip reduction after maneuver was undertaken. The hip was then prepared and draped in standard sterile fashion. An  incision over the tip of the greater trochanter was then created followed  by blunt dissection and identification of the tip of the greater  trochanter. A guide pin was placed under fluoroscopic imaging followed by  proximal reaming. It was then followed by selection of a 9-mm short  125-degree angled implant, which was then passed with the guide jig in  place. AP and lateral views confirmed good reduction and placement of the  implant. The centering guide was then used for placement of the lag screw  through a nick incision and placement of the guide pin into the  [id number] aspect of the femoral head, confirmed in both AP and  lateral planes. Screw length was estimated to be 85, this was drilled, and  the screw inserted with excellent purchase achieved. The proximal  locking system was then engaged and backed off followed by compression of  the implant and followed by final locking of the lag screw.   The proximal  jig was then adjusted, and a distal screw was inserted in the implant  through a small nick incision, and a  lag screw inserted in the tip of  the intramedullary device. The guide jig was then fully removed. AP and  lateral views confirmed excellent reduction of the fracture as well as  screw placement. The wounds were copiously irrigated out. Deep tissues  were closed with 0 Vicryl followed by 2-0 Vicryl, skin staples, and sterile  Dressing. The patient tolerated the procedure and was taken to the recovery room in stable condition.           Electronically signed by Felipe Suarez MD on 5/22/2021 at 6:03 PM

## 2021-05-22 NOTE — ANESTHESIA POSTPROCEDURE EVALUATION
Department of Anesthesiology  Postprocedure Note    Patient: Rose Mary Brink  MRN: [id number]  [place]: 1970  Date of evaluation: 5/22/2021  Time:  7:46 PM     Procedure Summary     Date: 05/22/21 Room / Location: [facility] OR 02 / [facility]    Anesthesia Start: 3098 Anesthesia Stop: 3142    Procedure: LEFT HIP OPEN REDUCTION INTERNAL FIXATION (Left Hip) Diagnosis: (/)    Surgeons: Giovanni Muniz MD Responsible Provider: Sonja Esposito DO    Anesthesia Type: general ASA Status: 3          Anesthesia Type: general    Carlin Phase I: Carlin Score: 9    Carlin Phase II:      Last vitals: Reviewed and per EMR flowsheets.        Anesthesia Post Evaluation    Patient location during evaluation: PACU  Patient participation: complete - patient participated  Level of consciousness: awake and alert  Airway patency: patent  Nausea & Vomiting: no nausea and no vomiting  Complications: no  Cardiovascular status: hemodynamically stable  Respiratory status: acceptable  Hydration status: euvolemic

## 2021-05-22 NOTE — PLAN OF CARE
Problem: Pain:  Goal: Pain level will decrease  Description: Pain level will decrease  Outcome: Met This Shift     Problem: Falls - Risk of:  Goal: Will remain free from falls  Description: Will remain free from falls  Outcome: Met This Shift     Problem: Pain - Acute:  Goal: Pain level will decrease  Description: Pain level will decrease  Outcome: Met This Shift     Problem: Skin Integrity:  Goal: Will show no infection signs and symptoms  Description: Will show no infection signs and symptoms  Outcome: Ongoing  Goal: Absence of new skin breakdown  Description: Absence of new skin breakdown  Outcome: Ongoing     Problem: Falls - Risk of:  Goal: Absence of physical injury  Description: Absence of physical injury  Outcome: Ongoing     Problem: Discharge Planning:  Goal: Knowledge of discharge instructions  Description: Knowledge of discharge instructions  Outcome: Ongoing     Problem: Mobility - Impaired:  Goal: Achieve maximum mobility level  Description: Achieve maximum mobility level  Outcome: Ongoing

## 2021-05-22 NOTE — H&P
History and Physical  Internal Medicine  Marissa Ayon MD    CHIEF COMPLAINT:  Hip fracture      HISTORY OF PRESENT ILLNESS:      The patient is a 45 y.o. female patient of mine who presents with as per ER - 53-year-old female presents to the emergency department with left hip fracture. The patient has been walking around after a fall 2 weeks ago with increasing pain initially an x-ray was done and then a CT of the left hip was done which patient has a disc for showing fracture to the left hip. She was sent in for admission and pain control. Patient denies other complaints at this time including chest pain shortness of breath nausea vomiting abdominal pain urinary symptoms or leg swelling. We have been trying for > 1 week to get approval for CT hip in that time pt has suffered significant pain        Past Medical History:   Diagnosis Date    Cirrhosis ([facility] [address] 2013           Past Surgical History:   Procedure Laterality Date    JOINT REPLACEMENT Left     shoulder       Medications Prior to Admission:    Medications Prior to Admission: traZODone (DESYREL) 150 MG tablet, Take 150 mg by mouth nightly  buPROPion (WELLBUTRIN XL) 150 MG extended release tablet, Take 150 mg by mouth every morning    Allergies:    Patient has no known allergies. Social History:    reports that she has been smoking. She has been smoking about 0.25 packs per day. She has never used smokeless tobacco. She reports current alcohol use of about 2.0 standard drinks of alcohol per week. She reports current drug use. Drug: Marijuana. Family History:   family history is not on file.     REVIEW OF SYSTEMS:  As above in the HPI, otherwise negative    Vital Signs:  BP (!) 178/72   Pulse 88   Temp 97.8 °F (36.6 °C) (Oral)   Resp 16   Ht 5' 4\" (1.626 m)   Wt 100 lb (45.4 kg)   SpO2 98%   BMI 17.16 kg/m²     Physical Exam:    General appearance: alert, appears stated age and cooperative  Head: Normocephalic, without obvious abnormality, atraumatic  Eyes: conjunctivae/corneas clear. PERRL, EOM's intact. Fundi benign. Throat: lips, mucosa, and tongue normal; teeth and gums normal  Neck: no adenopathy, no carotid bruit, no JVD, supple, symmetrical, trachea midline and thyroid not enlarged, symmetric, no tenderness/mass/nodules  Back: symmetric, no curvature. ROM normal. No CVA tenderness.   Lungs: clear to auscultation bilaterally  Chest wall: no tenderness  Heart: regular rate and rhythm, S1, S2 normal, no murmur, click, rub or gallop  Abdomen: soft, non-tender; bowel sounds normal; no masses,  no organomegaly  Extremities: extremities normal, atraumatic, no cyanosis or edema  Pulses: 2+ and symmetric  Skin: Skin color, texture, turgor normal. No rashes or lesions  Lymph nodes: Cervical, supraclavicular, and axillary nodes normal.  Neurologic: Grossly normal  Rectal: deferred    LABS:    Recent Results (from the past 24 hour(s))   CBC auto differential    Collection Time: 05/21/21 12:39 PM   Result Value Ref Range    WBC 4.6 4.5 - 11.5 E9/L    RBC 3.48 (L) 3.50 - 5.50 E12/L    Hemoglobin 12.5 11.5 - 15.5 g/dL    Hematocrit 36.8 34.0 - 48.0 %    .7 (H) 80.0 - 99.9 fL    MCH 35.9 (H) 26.0 - 35.0 pg    MCHC 34.0 32.0 - 34.5 %    RDW 14.6 11.5 - 15.0 fL    Platelets 943 (L) 006 - 450 E9/L    MPV 10.4 7.0 - 12.0 fL    Neutrophils % 57.0 43.0 - 80.0 %    Immature Granulocytes % 0.2 0.0 - 5.0 %    Lymphocytes % 30.5 20.0 - 42.0 %    Monocytes % 10.1 2.0 - 12.0 %    Eosinophils % 1.3 0.0 - 6.0 %    Basophils % 0.9 0.0 - 2.0 %    Neutrophils Absolute 2.59 1.80 - 7.30 E9/L    Immature Granulocytes # 0.01 E9/L    Lymphocytes Absolute 1.39 (L) 1.50 - 4.00 E9/L    Monocytes Absolute 0.46 0.10 - 0.95 E9/L    Eosinophils Absolute 0.06 0.05 - 0.50 E9/L    Basophils Absolute 0.04 0.00 - 0.20 X5/L   Basic Metabolic Panel    Collection Time: 05/21/21 12:39 PM   Result Value Ref Range    Sodium 142 132 - 146 mmol/L    Potassium 3.9 3.5 - 5.0 mmol/L    Chloride CREATININE 0.4 (L) 0.5 - 1.0 mg/dL    GFR Non-African American >60 >=60 mL/min/1.73    GFR African American >60     Calcium 9.1 8.6 - 10.2 mg/dL    Total Protein 7.2 6.4 - 8.3 g/dL    Albumin 4.0 3.5 - 5.2 g/dL    Total Bilirubin 1.5 (H) 0.0 - 1.2 mg/dL    Alkaline Phosphatase 169 (H) 35 - 104 U/L    ALT 18 0 - 32 U/L    AST 51 (H) 0 - 31 U/L       Radiology:     Chest  Xray:  No results found for this or any previous visit. No results found for this or any previous visit. Other:  none      ASSESSMENT:      Principal Problem:    Closed intertrochanteric fracture of hip, left, initial encounter [facility])  Active Problems:    Cirrhosis ([facility] [address]  Resolved Problems:    * No resolved hospital problems.  *      PLAN:    Low medium surgical risk based on cirrhosis but stable for OR    Amy Miller MD  6:21 AM  5/22/2021

## 2021-05-22 NOTE — BRIEF OP NOTE
Brief Postoperative Note      Patient: Delaney Chaidez  YOB: 1970  MRN: [id number]    Date of Procedure: 5/22/2021    Pre-Op Diagnosis: left hip fracture    Post-Op Diagnosis: Same       Procedure(s):  LEFT HIP OPEN REDUCTION INTERNAL FIXATION    Surgeon(s):  Everton Rajput MD    Assistant:  * No surgical staff found *    Anesthesia: General    Estimated Blood Loss (mL): less than 505     Complications: None    Specimens:   * No specimens in log *    Implants:  Implant Name Type Inv.  Item Serial No.  Lot No. LRB No. Used Action   NAIL IM L170MM DIA9MM 125DEG SHT GRN PROX FEM TI  NAIL IM L170MM DIA9MM 125DEG SHT GRN PROX FEM TI  DEPUY [employer]- [id number] Left 1 Implanted   SCREW BNE L85MM DIA10.35MM G TI [place] PERF FOR PROX FEM  SCREW BNE L85MM DIA10.35MM G TI [place] PERF FOR PROX FEM  DEPU[employer]- [id number] Left 1 Implanted   SCREW BNE LCK 5X30 MM W/ T25 STARDRV FOR IM NAIL TI STRL  SCREW BNE LCK 5X30 MM W/ T25 STARDRV FOR IM NAIL TI STRL  DEPUY SYNTHES [place]-WD [id number] Left 1 Implanted         Drains: * No LDAs found *    Findings: see op note    Electronically signed by Everton Rajput MD on 5/22/2021 at 6:04 PM

## 2021-05-23 LAB — MRSA CULTURE ONLY: NORMAL

## 2021-05-23 PROCEDURE — 2580000003 HC RX 258: Performed by: ORTHOPAEDIC SURGERY

## 2021-05-23 PROCEDURE — 99024 POSTOP FOLLOW-UP VISIT: CPT | Performed by: ORTHOPAEDIC SURGERY

## 2021-05-23 PROCEDURE — 6370000000 HC RX 637 (ALT 250 FOR IP): Performed by: ORTHOPAEDIC SURGERY

## 2021-05-23 PROCEDURE — 51798 US URINE CAPACITY MEASURE: CPT

## 2021-05-23 PROCEDURE — 6360000002 HC RX W HCPCS: Performed by: ORTHOPAEDIC SURGERY

## 2021-05-23 PROCEDURE — 97161 PT EVAL LOW COMPLEX 20 MIN: CPT

## 2021-05-23 PROCEDURE — 1200000000 HC SEMI PRIVATE

## 2021-05-23 PROCEDURE — 97165 OT EVAL LOW COMPLEX 30 MIN: CPT

## 2021-05-23 RX ADMIN — OXYCODONE HYDROCHLORIDE 10 MG: 5 TABLET ORAL at 11:21

## 2021-05-23 RX ADMIN — WATER 1000 MG: 1 INJECTION INTRAMUSCULAR; INTRAVENOUS; SUBCUTANEOUS at 01:30

## 2021-05-23 RX ADMIN — MORPHINE SULFATE 2 MG: 2 INJECTION, SOLUTION INTRAMUSCULAR; INTRAVENOUS at 02:12

## 2021-05-23 RX ADMIN — OXYCODONE HYDROCHLORIDE 10 MG: 5 TABLET ORAL at 05:55

## 2021-05-23 RX ADMIN — ENOXAPARIN SODIUM 30 MG: 30 INJECTION SUBCUTANEOUS at 09:00

## 2021-05-23 RX ADMIN — OXYCODONE HYDROCHLORIDE 10 MG: 5 TABLET ORAL at 21:00

## 2021-05-23 RX ADMIN — ENOXAPARIN SODIUM 30 MG: 30 INJECTION SUBCUTANEOUS at 20:59

## 2021-05-23 RX ADMIN — DOCUSATE SODIUM 50 MG AND SENNOSIDES 8.6 MG 1 TABLET: 8.6; 5 TABLET, FILM COATED ORAL at 09:51

## 2021-05-23 RX ADMIN — Medication 10 ML: at 09:51

## 2021-05-23 RX ADMIN — WATER 1000 MG: 1 INJECTION INTRAMUSCULAR; INTRAVENOUS; SUBCUTANEOUS at 09:57

## 2021-05-23 RX ADMIN — OXYCODONE HYDROCHLORIDE 10 MG: 5 TABLET ORAL at 16:12

## 2021-05-23 ASSESSMENT — PAIN - FUNCTIONAL ASSESSMENT
PAIN_FUNCTIONAL_ASSESSMENT: PREVENTS OR INTERFERES SOME ACTIVE ACTIVITIES AND ADLS
PAIN_FUNCTIONAL_ASSESSMENT: ACTIVITIES ARE NOT PREVENTED
PAIN_FUNCTIONAL_ASSESSMENT: ACTIVITIES ARE NOT PREVENTED
PAIN_FUNCTIONAL_ASSESSMENT: PREVENTS OR INTERFERES SOME ACTIVE ACTIVITIES AND ADLS

## 2021-05-23 ASSESSMENT — PAIN DESCRIPTION - FREQUENCY
FREQUENCY: INTERMITTENT

## 2021-05-23 ASSESSMENT — PAIN DESCRIPTION - DIRECTION: RADIATING_TOWARDS: ANKLES

## 2021-05-23 ASSESSMENT — PAIN SCALES - GENERAL
PAINLEVEL_OUTOF10: 3
PAINLEVEL_OUTOF10: 8
PAINLEVEL_OUTOF10: 7
PAINLEVEL_OUTOF10: 0
PAINLEVEL_OUTOF10: 0
PAINLEVEL_OUTOF10: 7

## 2021-05-23 ASSESSMENT — PAIN DESCRIPTION - PROGRESSION
CLINICAL_PROGRESSION: RAPIDLY IMPROVING
CLINICAL_PROGRESSION: GRADUALLY WORSENING

## 2021-05-23 ASSESSMENT — PAIN DESCRIPTION - PAIN TYPE
TYPE: SURGICAL PAIN

## 2021-05-23 ASSESSMENT — PAIN DESCRIPTION - LOCATION
LOCATION: HIP
LOCATION: HIP

## 2021-05-23 ASSESSMENT — PAIN DESCRIPTION - ONSET
ONSET: ON-GOING

## 2021-05-23 ASSESSMENT — PAIN DESCRIPTION - DESCRIPTORS
DESCRIPTORS: ACHING;DISCOMFORT
DESCRIPTORS: DISCOMFORT;SHARP;RADIATING

## 2021-05-23 ASSESSMENT — PAIN DESCRIPTION - ORIENTATION
ORIENTATION: LEFT
ORIENTATION: LEFT

## 2021-05-23 NOTE — PLAN OF CARE
Problem: Pain:  Goal: Pain level will decrease  Description: Pain level will decrease  5/22/2021 2241 by Vincent Magaña RN  Outcome: Met This Shift  5/22/2021 1142 by Johanne Valladares RN  Outcome: Met This Shift  Goal: Control of acute pain  Description: Control of acute pain  Outcome: Met This Shift  Goal: Control of chronic pain  Description: Control of chronic pain  Outcome: Met This Shift     Problem: Skin Integrity:  Goal: Will show no infection signs and symptoms  Description: Will show no infection signs and symptoms  5/22/2021 2241 by Vincent Magaña RN  Outcome: Met This Shift  5/22/2021 1142 by Johanne Valladares RN  Outcome: Ongoing  Goal: Absence of new skin breakdown  Description: Absence of new skin breakdown  5/22/2021 2241 by Vincent Magaañ RN  Outcome: Met This Shift  5/22/2021 1142 by Johanne Valladares RN  Outcome: Ongoing     Problem: Falls - Risk of:  Goal: Will remain free from falls  Description: Will remain free from falls  5/22/2021 2241 by Vincent Magaña RN  Outcome: Met This Shift  5/22/2021 1142 by Johanne Valladares RN  Outcome: Met This Shift  Goal: Absence of physical injury  Description: Absence of physical injury  5/22/2021 2241 by Vincent Magaña RN  Outcome: Met This Shift  5/22/2021 1142 by Johanne Valladares RN  Outcome: Ongoing     Problem: Discharge Planning:  Goal: Knowledge of discharge instructions  Description: Knowledge of discharge instructions  5/22/2021 2241 by Vincent Magaña RN  Outcome: Met This Shift  5/22/2021 1142 by Johanne Valladares RN  Outcome: Ongoing     Problem: Infection - Surgical Site:  Goal: Signs of wound healing will improve  Description: Signs of wound healing will improve  Outcome: Met This Shift     Problem: Mobility - Impaired:  Goal: Achieve maximum mobility level  Description: Achieve maximum mobility level  5/22/2021 2241 by Vincent Magaña RN  Outcome: Met This Shift  5/22/2021 1142 by Johanne Valladares RN  Outcome: Ongoing     Problem: Pain - Acute:  Goal: Pain level will decrease  Description: Pain level will decrease  5/22/2021 2241 by Patrick Vasquez RN  Outcome: Met This Shift  5/22/2021 1142 by Medhat Cotto RN  Outcome: Met This Shift

## 2021-05-23 NOTE — PROGRESS NOTES
Pt dangled at bedside up to bedside commode with walker tolerated well. Assist back to bed. Hourly rounds continued.

## 2021-05-23 NOTE — PROGRESS NOTES
light, wall phone on top bed RT side. All questions answered. Very appreciative for care. Treatment:  Patient practiced and was instructed in the following treatment:     Eval    Pt's/ family goals   1. Return home     Patient and or family understand(s) diagnosis, prognosis, and plan of care. PLAN:    PT care will be provided in accordance with the objectives noted above. Exercises and functional mobility practice will be used as well as appropriate assistive devices or modalities to obtain goals. Patient and family education will also be administered as needed. Frequency of treatments:  BID  Total Treatment Time  0 minutes     Evaluation Time includes thorough review of current medical information, gathering information on past medical history/social history and prior level of function, completion of standardized testing/informal observation of tasks, assessment of data and education on plan of care and goals.     CPT codes:  [x] Low Complexity PT evaluation [address] [place] [address] Dr RAMÍREZ

## 2021-05-23 NOTE — PROGRESS NOTES
Occupational Therapy  OCCUPATIONAL THERAPY INITIAL EVALUATION      Date:2021  Patient Name: Melvin Alvarado  MRN: [id number]  : 1970  Room: [address] [place] [address]A    OCCUPATIONAL THERAPY INITIAL EVALUATION    [facility] [place], [place]          Date:2021                                                  Patient Name: Melvin Alvarado    MRN: [id number]    : 1970    Room: [address] [place], [place] [address]      Evaluating OT: Jhon Aviles OTR/L   [id number]      Referring Raquel Arango MD    Specific Provider Orders/Date: eval and treat 2021      Diagnosis: L hip fracture    Presents to ED s/p fall     Surgery:LEFT HIP OPEN REDUCTION INTERNAL FIXATION 2021      Pertinent Medical History: cirrhosis     Precautions:  Fall Risk, WBAT L LE     Assessment of current deficits    [] Functional mobility  [x]ADLs  [x] Strength               [x]Cognition    [x] Functional transfers   [x] IADLs         [x] Safety Awareness   [x]Endurance    [x] Fine Coordination              [x] Balance      [] Vision/perception   [x]Sensation     []Gross Motor Coordination  [] ROM  [] Delirium                   [] Motor Control     OT PLAN OF CARE   OT POC based on physician orders, patient diagnosis and results of clinical assessment    Frequency/Duration  2-4days/wk for 2 weeks PRN   Specific OT Treatment Interventions to include:   [x]ADL retraining/adapted techniques and AE recommendations to increase functional independence within precautions                    [x]Energy conservation techniques to improve tolerance for selfcare routine   [x]Functional transfer/mobility training/DME recommendations for increased independence, safety and fall prevention         [x]Patient/family education to increase safety and functional independence             [x]Environmental modifications for safe mobility and completion of ADLs                             [x]Therapeutic activity to improve functional performance during ADLs. [x]Therapeutic exercise to improve tolerance and functional strength for ADLs   [x]Balance retraining/tolerance tasks for facilitation of postural control with dynamic challenges during ADLs . [x]Positioning to improve functional independence  []Cognitive retraining ex's to improve problem solving skills & safe participation in ADLs/IADLs     []Sensory re-education techniques to improve extremity awareness, maintain skin integrity and improve hand function                             []Visual/Perceptual retraining  to improve body awareness and safety during transfers and ADLs  []Splinting/positioning needs to maintain joint/skin integrity and prevent contractures.   []Neuromuscular re-education: facilitation of righting/equilibrium reactions, midline orientation, scapular stability/mobility, Normalization muscle     tone and facilitation active functional movement/Attention                         []Delirium prevention/treatment  []Other    Recommended Adaptive Equipment: walker, possible lower body AE - TBD     Home Living: Pt lives with  , 2 story with 3-4 steps/rail to enter  Bed/bath on 1st, laundry in 1st   Bathroom setup: walk in shower with grab bar      Prior Level of Function: Independent  with ADLs , assist as needed  with IADLs; ambulated with cane occasionally  Driving: no     Pain Level: surgical pain   Cognition: A&O: 4/4; patient emotional, increase pain    Memory:  Good    Sequencing:  good   Problem solving:  Good    Judgement/safety:  Good      Functional Assessment:  AM-PAC Daily Activity Raw Score: 18/24   Initial Eval Status  Date: 5/23/21 Treatment Status  Date: STGs = LTGs  Time frame: 10-14 days   Feeding Independent      Grooming Set-up   Seated - decrease standing tolerance  Independent    UB Dressing Set-up   Independent    LB Dressing Mod A  Able to lift R leg to reach sock  Increase pain L leg   Mod I    Bathing Min A  Mod I    Toileting SBA   Independent    Bed Mobility  Independent   Supine <> sit         Functional Transfers SBA  Sit-stand from bed   Mod I    Functional Mobility SBA,w/walker   Side steps only   Returned to bed , fatigued and pain   Mod I    Balance Sitting:     Static:  Independent     Dynamic:SBA   Standing: SBA   Independent standing with good tolerance   Activity Tolerance Fair  Pain limiting   Good  with ADL activity    Visual/  Perceptual No focal deficits                 Hand Dominance   AROM (PROM) Strength Additional Info:    RUE  WFL WFL good  and wfl FMC/dexterity noted during ADL tasks       LUE WFL WFL good  and wfl FMC/dexterity noted during ADL tasks       Hearing: WFL   Sensation:  No c/o numbness or tingling   Tone: WFL       Comments: Upon arrival patient lying in bed . At end of session, patient returned to bed  with call light and phone within reach, all lines and tubes intact. ALARM ON  Overall patient demonstrated  decreased independence and safety during completion of ADL/functional transfer/mobility tasks. Pt would benefit from continued skilled OT to increase safety and independence with completion of ADL/IADL tasks for functional independence and quality of life. Rehab Potential: good for established goals     Patient / Family Goal: return home      Patient and/or family were instructed on functional diagnosis, prognosis/goals and OT plan of care. Demonstrated good  understanding.      Eval Complexity: Low    Time In: 1253  Time Out: 1310    Min Units   OT Eval Low 97165 x  1   OT Eval Medium 58189      OT Eval High 64895      OT Re-Eval [id number]       Therapeutic Ex 81913       Therapeutic Activities 39524       ADL/Self Care 79188       Orthotic Management 08471       Manual 27040     Neuro Re-Ed 09266       Non-Billable Time         Evaluation Time additionally includes thorough review of current medical information, gathering information on past medical history/social history and prior level of function, interpretation of standardized testing/informal observation of tasks, assessment of data and development of plan of care and goals.             Nicolasa Pearce  OTR/L  OT [id number]

## 2021-05-23 NOTE — PROGRESS NOTES
Department of Orthopedic Surgery  Radha Mello MD      SUBJECTIVE  Pt seen and examined. Pain controlled. No new complaints. OBJECTIVE    Physical    VITALS:  BP (!) 131/58   Pulse 70   Temp 99.7 °F (37.6 °C) (Infrared)   Resp 16   Ht 5' 4\" (1.626 m)   Wt 100 lb (45.4 kg)   SpO2 97%   BMI 17.16 kg/m²     left LE:   · Dressing C/D/I  · Distal sensory intact  · +2/4 PT and DP  · +PF/DF/EHL  · Compartments soft and compressible    Data    CBC:   Lab Results   Component Value Date    WBC 5.3 05/22/2021    RBC 3.73 05/22/2021    HGB 12.8 05/22/2021    HCT 39.9 05/22/2021    .0 05/22/2021    MCH 34.3 05/22/2021    MCHC 32.1 05/22/2021    RDW 14.0 05/22/2021     05/22/2021    MPV 10.2 05/22/2021     PT/INR:    Lab Results   Component Value Date    PROTIME 14.1 05/22/2021    INR 1.3 05/22/2021         ASSESSMENT   · POD 1 s/p Left IT fx TFNA    PLAN    · Weightbearing as tolerated left lower extremity  · Lovenox DVT prophylaxis, may discharge home on aspirin when ambulatory  · Dressing change on postop day #5.   · May follow-up office 1 to 2 weeks    Electronically signed by Juice Powell MD on 5/23/2021 at 8:51 AM    ·

## 2021-05-23 NOTE — PROGRESS NOTES
Pt DTV, ambulated and placed in bedside commode voided 50, , call placed to MD, answering service awaiting call back

## 2021-05-24 VITALS
SYSTOLIC BLOOD PRESSURE: 119 MMHG | RESPIRATION RATE: 14 BRPM | OXYGEN SATURATION: 93 % | WEIGHT: 100 LBS | TEMPERATURE: 97.1 F | HEIGHT: 64 IN | BODY MASS INDEX: 17.07 KG/M2 | HEART RATE: 61 BPM | DIASTOLIC BLOOD PRESSURE: 57 MMHG

## 2021-05-24 PROCEDURE — 97535 SELF CARE MNGMENT TRAINING: CPT

## 2021-05-24 PROCEDURE — 2580000003 HC RX 258: Performed by: ORTHOPAEDIC SURGERY

## 2021-05-24 PROCEDURE — 6360000002 HC RX W HCPCS: Performed by: ORTHOPAEDIC SURGERY

## 2021-05-24 PROCEDURE — 97530 THERAPEUTIC ACTIVITIES: CPT

## 2021-05-24 PROCEDURE — 6370000000 HC RX 637 (ALT 250 FOR IP): Performed by: ORTHOPAEDIC SURGERY

## 2021-05-24 RX ORDER — GABAPENTIN 300 MG/1
300 CAPSULE ORAL 3 TIMES DAILY
Status: DISCONTINUED | OUTPATIENT
Start: 2021-05-24 | End: 2021-05-24 | Stop reason: HOSPADM

## 2021-05-24 RX ADMIN — ENOXAPARIN SODIUM 30 MG: 30 INJECTION SUBCUTANEOUS at 09:54

## 2021-05-24 RX ADMIN — GABAPENTIN 300 MG: 300 CAPSULE ORAL at 14:11

## 2021-05-24 RX ADMIN — SODIUM CHLORIDE, PRESERVATIVE FREE 10 ML: 5 INJECTION INTRAVENOUS at 00:04

## 2021-05-24 RX ADMIN — DOCUSATE SODIUM 50 MG AND SENNOSIDES 8.6 MG 1 TABLET: 8.6; 5 TABLET, FILM COATED ORAL at 09:55

## 2021-05-24 RX ADMIN — Medication 10 ML: at 09:56

## 2021-05-24 RX ADMIN — GABAPENTIN 300 MG: 300 CAPSULE ORAL at 09:55

## 2021-05-24 RX ADMIN — OXYCODONE HYDROCHLORIDE 10 MG: 5 TABLET ORAL at 10:02

## 2021-05-24 RX ADMIN — DOCUSATE SODIUM 50 MG AND SENNOSIDES 8.6 MG 1 TABLET: 8.6; 5 TABLET, FILM COATED ORAL at 03:58

## 2021-05-24 RX ADMIN — Medication 10 ML: at 00:08

## 2021-05-24 RX ADMIN — MORPHINE SULFATE 2 MG: 2 INJECTION, SOLUTION INTRAMUSCULAR; INTRAVENOUS at 00:04

## 2021-05-24 RX ADMIN — OXYCODONE HYDROCHLORIDE 10 MG: 5 TABLET ORAL at 03:58

## 2021-05-24 ASSESSMENT — PAIN DESCRIPTION - LOCATION
LOCATION: HIP
LOCATION: HIP

## 2021-05-24 ASSESSMENT — PAIN DESCRIPTION - PAIN TYPE
TYPE: SURGICAL PAIN
TYPE: SURGICAL PAIN

## 2021-05-24 ASSESSMENT — PAIN DESCRIPTION - FREQUENCY
FREQUENCY: INTERMITTENT
FREQUENCY: CONTINUOUS

## 2021-05-24 ASSESSMENT — PAIN DESCRIPTION - DESCRIPTORS: DESCRIPTORS: ACHING;CONSTANT;DISCOMFORT

## 2021-05-24 ASSESSMENT — PAIN - FUNCTIONAL ASSESSMENT
PAIN_FUNCTIONAL_ASSESSMENT: PREVENTS OR INTERFERES SOME ACTIVE ACTIVITIES AND ADLS
PAIN_FUNCTIONAL_ASSESSMENT: PREVENTS OR INTERFERES SOME ACTIVE ACTIVITIES AND ADLS

## 2021-05-24 ASSESSMENT — PAIN SCALES - GENERAL
PAINLEVEL_OUTOF10: 7
PAINLEVEL_OUTOF10: 0
PAINLEVEL_OUTOF10: 6

## 2021-05-24 ASSESSMENT — PAIN DESCRIPTION - PROGRESSION
CLINICAL_PROGRESSION: GRADUALLY WORSENING
CLINICAL_PROGRESSION: RAPIDLY IMPROVING
CLINICAL_PROGRESSION: RAPIDLY IMPROVING

## 2021-05-24 ASSESSMENT — PAIN DESCRIPTION - ONSET: ONSET: ON-GOING

## 2021-05-24 ASSESSMENT — PAIN DESCRIPTION - ORIENTATION: ORIENTATION: LEFT

## 2021-05-24 NOTE — CARE COORDINATION
Met with patient about diagnosis and discharge plan of care. Pt lives with her spouse in 2 story home. Pt had mechanical fall over  2 weeks ago, left hip ORIF on 5/22. Plan is home today with [facility], referral called and orders completed. Pt needs wheeled walker(ordered through [address]. She has no issues with commode and has walk in shower.  Plan home today-NINA

## 2021-05-24 NOTE — PLAN OF CARE
Problem: Pain:  Goal: Pain level will decrease  Description: Pain level will decrease  Outcome: Met This Shift  Goal: Control of acute pain  Description: Control of acute pain  Outcome: Met This Shift  Goal: Control of chronic pain  Description: Control of chronic pain  Outcome: Met This Shift     Problem: Skin Integrity:  Goal: Will show no infection signs and symptoms  Description: Will show no infection signs and symptoms  Outcome: Met This Shift  Goal: Absence of new skin breakdown  Description: Absence of new skin breakdown  Outcome: Met This Shift     Problem: Falls - Risk of:  Goal: Will remain free from falls  Description: Will remain free from falls  Outcome: Met This Shift  Goal: Absence of physical injury  Description: Absence of physical injury  Outcome: Met This Shift     Problem: Discharge Planning:  Goal: Knowledge of discharge instructions  Description: Knowledge of discharge instructions  Outcome: Met This Shift     Problem: Infection - Surgical Site:  Goal: Signs of wound healing will improve  Description: Signs of wound healing will improve  Outcome: Met This Shift     Problem: Mobility - Impaired:  Goal: Achieve maximum mobility level  Description: Achieve maximum mobility level  Outcome: Met This Shift     Problem: Pain - Acute:  Goal: Pain level will decrease  Description: Pain level will decrease  Outcome: Met This Shift

## 2021-05-24 NOTE — PROGRESS NOTES
Occupational Therapy  OT BEDSIDE TREATMENT NOTE      Date:2021  Patient Name: Dee Carter  MRN: [id number]  : 1970  Room: [address] [place] [address]       Evaluating OT: Lynnette Deutsch OTR/L   [id number]       Referring Inge Gaxiola MD    Specific Provider Orders/Date: eval and treat 2021       Diagnosis: L hip fracture    Presents to ED s/p fall     Surgery:LEFT HIP OPEN REDUCTION INTERNAL FIXATION 2021      Pertinent Medical History: cirrhosis     Precautions:  Fall Risk, WBAT L LE      Assessment of current deficits    []? Functional mobility            [x]?ADLs           [x]? Strength                  [x]? Cognition    [x]? Functional transfers          [x]? IADLs         [x]? Safety Awareness   [x]? Endurance    [x]? Fine Coordination                         [x]? Balance      []? Vision/perception   [x]? Sensation      []? Gross Motor Coordination             []? ROM           []? Delirium                   []? Motor Control      OT PLAN OF CARE   OT POC based on physician orders, patient diagnosis and results of clinical assessment     Frequency/Duration  2-4days/wk for 2 weeks PRN   Specific OT Treatment Interventions to include:   [x]? ?ADL retraining/adapted techniques and AE recommendations to increase functional independence within precautions                    [x]? ? Energy conservation techniques to improve tolerance for selfcare routine   [x]? ? Functional transfer/mobility training/DME recommendations for increased independence, safety and fall prevention         [x]? ?Patient/family education to increase safety and functional independence             [x]? ? Environmental modifications for safe mobility and completion of ADLs                             [x]? ? Therapeutic activity to improve functional performance during ADLs.                                         [x]? ? Therapeutic exercise to improve tolerance and functional strength for ADLs   [x]? ? Balance retraining/tolerance tasks for facilitation of postural control with dynamic challenges during ADLs .   [x]? Positioning to improve functional independence  []? ? Cognitive retraining ex's to improve problem solving skills & safe participation in ADLs/IADLs     []? ?Sensory re-education techniques to improve extremity awareness, maintain skin integrity and improve hand function                             []? ? Visual/Perceptual retraining  to improve body awareness and safety during transfers and ADLs  []? ? Splinting/positioning needs to maintain joint/skin integrity and prevent contractures.  []?? Neuromuscular re-education: facilitation of righting/equilibrium reactions, midline orientation, scapular stability/mobility, Normalization muscle     tone and facilitation active functional movement/Attention                         []? ? Delirium prevention/treatment  []? ? Other     Recommended Adaptive Equipment: walker, 3-in-1      Home Living: Pt lives with  , 2 story with 3-4 steps/rail to enter  Bed/bath on 1st, laundry in 1st   Bathroom setup: walk in shower with grab bar       Prior Level of Function: Independent  with ADLs , assist as needed  with IADLs; ambulated with cane occasionally  Driving: no      Pain Level: L hip pain  Cognition: Alert and oriented. Functional Assessment:  AM-PAC Daily Activity Raw Score: 19/24    Initial Eval Status  Date: 5/23/21 Treatment Status STGs = LTGs  Time frame: 10-14 days   Feeding Independent        Grooming Set-up   Seated - decrease standing tolerance Supervision while standing at the sink.   Independent    UB Dressing Set-up  Setup   Independent    LB Dressing Mod A  Able to lift R leg to reach sock  Increase pain L leg   SBA without need for adaptive equipment. Pt instructed with compensatory strategies for LB dressing. Mod I    Bathing Min A SBA    Walk in shower transfer completed with SBA after instruction.   Mod I    Toileting SBA  Supervision   Independent    Bed Mobility Independent   Supine <> sit     Independent supine to sit       Functional Transfers SBA  Sit-stand from bed   SBA. Mod cues for proper hand placement during transfers throughout this session. Mod I    Functional Mobility SBA,w/walker   Side steps only   Returned to bed , fatigued and pain   supervision using w/w  Mod I    Balance Sitting:     Static:  Independent     Dynamic:SBA   Standing: SBA  SBA during ADL activity.   Independent standing with good tolerance       Comments:  Pt pleasant and cooperative. No need for adaptive equipment at this time. Pt able to complete LB dressing slowly. Cues for safety throughout session. Education/treatment:  ADL retraining with facilitation of movement and instruction of compensatory strategies for self care skills. Therapeutic activity to address balance and endurance for ADL and transfers. Pt education of transfer safety, walker safety, and home safety. · Pt has made  progress towards set goals.    ·   Time In: 7:50   Time Out: 8:30      Min Units   Therapeutic Ex 63675     Therapeutic Activities 48914 5    ADL/Self Care 39590 35 3   Orthotic Management 19053     Neuro Re-Ed 64512     Non-Billable Time     TOTAL TIMED TREATMENT 40  [address] SAEED/L [id number]

## 2021-05-24 NOTE — PROGRESS NOTES
Internal Medicine  Progress Note  Andre Watson MD     Subjective:     Patient is alert. Patient reports pain is still terrible tearful when describing and inability to sleep. Tolerating diet well no other c/o. Scheduled Meds:   traZODone  150 mg Oral Nightly    sennosides-docusate sodium  1 tablet Oral BID    enoxaparin  30 mg Subcutaneous BID    sodium chloride flush  5-40 mL Intravenous 2 times per day     Continuous Infusions:   sodium chloride       PRN Meds:magnesium hydroxide, sodium chloride flush, sodium chloride, promethazine **OR** ondansetron, polyethylene glycol, oxyCODONE **OR** oxyCODONE, morphine **OR** morphine    Objective:      Physical Exam:  Vitals:   /67   Pulse 89   Temp 98.3 °F (36.8 °C) (Oral)   Resp 15   Ht 5' 4\" (1.626 m)   Wt 100 lb (45.4 kg)   SpO2 92%   BMI 17.16 kg/m²   I/O's    Intake/Output Summary (Last 24 hours) at 5/24/2021 0647  Last data filed at 5/23/2021 1504  Gross per 24 hour   Intake 220 ml   Output 250 ml   Net -30 ml     Exam:  General appearance: alert, appears stated age and cooperative  Head: Normocephalic, without obvious abnormality, atraumatic  Eyes: conjunctivae/corneas clear. PERRL, EOM's intact. Fundi benign.   Throat: lips, mucosa, and tongue normal; teeth and gums normal  Neck: no adenopathy, no carotid bruit, no JVD, supple, symmetrical, trachea midline and thyroid not enlarged, symmetric, no tenderness/mass/nodules  Back: negative  Lungs: clear to auscultation bilaterally  Chest wall: no tenderness  Heart: regular rate and rhythm  Abdomen: soft, non-tender; bowel sounds normal; no masses,  no organomegaly  Extremities: extremities normal, atraumatic, no cyanosis or edema  Pulses: 2+ and symmetric  Skin: Skin color, texture, turgor normal. No rashes or lesions  Lymph nodes: Cervical, supraclavicular, and axillary nodes normal.  Neurologic: Grossly normal      Imaging     Chest  Xray:  No results found for this or any previous visit. No results found for this or any previous visit. Additional Imaging:  none    Lab Review   No results found for this or any previous visit (from the past 24 hour(s)). Assessment:     Principal Problem:    Closed intertrochanteric fracture of hip, left, initial encounter [facility])  Active Problems:    Cirrhosis ([address]  Resolved Problems:    * No resolved hospital problems.  *      Plan:   Pain control per ortho  Said she was to get 1 or 2 therapy sessions today - would take full advantage of that before discharge  Doyle Monroe MD  5/24/2021  6:47 AM

## 2021-05-24 NOTE — CARE COORDINATION
Social Work:    Referral called/faxed to Syl Restrepo today for a wheeled walker. Syl Restrepo is aware of discharge home today.     Electronically signed by ANNELISE Roy on 5/24/2021 at 10:29 AM

## 2021-05-24 NOTE — PROGRESS NOTES
CLINICAL PHARMACY NOTE: MEDS TO BEDS    Total # of Prescriptions Filled: 2   The following medications were delivered to the patient:  · Oxycodone 5-325 mg  · Adult aspirin 81 mg    Additional Documentation:

## 2021-05-24 NOTE — PROGRESS NOTES
Department of Orthopedic Surgery  Celia Canales MD      SUBJECTIVE  Pt seen and examined. Still complains of pain left hip    OBJECTIVE    Physical    VITALS:  /67   Pulse 89   Temp 98.3 °F (36.8 °C) (Oral)   Resp 15   Ht 5' 4\" (1.626 m)   Wt 100 lb (45.4 kg)   SpO2 92%   BMI 17.16 kg/m²     left LE:   · Dressing saturated.  Removed and nursing notified to replace  · Distal sensory intact  · +2/4 PT and DP  · +PF/DF/EHL  · Compartments soft and compressible    Data    CBC:   Lab Results   Component Value Date    WBC 5.3 05/22/2021    RBC 3.73 05/22/2021    HGB 12.8 05/22/2021    HCT 39.9 05/22/2021    .0 05/22/2021    MCH 34.3 05/22/2021    MCHC 32.1 05/22/2021    RDW 14.0 05/22/2021     05/22/2021    MPV 10.2 05/22/2021     PT/INR:    Lab Results   Component Value Date    PROTIME 14.1 05/22/2021    INR 1.3 05/22/2021         ASSESSMENT   · POD 2 s/p Left Hip ORIF    PLAN    · WBAT  · Add gabapentin for pain control  · D[place]ing changed   · Follow up office 7-10 days  · OK for discharge when pain controlled     Electronically signed by Rena Hyman MD on 5/24/2021 at 7:07 AM

## 2021-05-28 ENCOUNTER — TELEPHONE (OUTPATIENT)
Dept: ORTHOPEDIC SURGERY | Age: 51
End: 2021-05-28

## 2021-05-28 DIAGNOSIS — S72.002A CLOSED LEFT HIP FRACTURE, INITIAL ENCOUNTER (HCC): Primary | ICD-10-CM

## 2021-06-04 ENCOUNTER — OFFICE VISIT (OUTPATIENT)
Dept: ORTHOPEDIC SURGERY | Age: 51
End: 2021-06-04

## 2021-06-04 VITALS — BODY MASS INDEX: 17.93 KG/M2 | WEIGHT: 105 LBS | RESPIRATION RATE: 18 BRPM | HEIGHT: 64 IN

## 2021-06-04 DIAGNOSIS — S72.002A CLOSED LEFT HIP FRACTURE, INITIAL ENCOUNTER (HCC): Primary | ICD-10-CM

## 2021-06-04 PROCEDURE — 99024 POSTOP FOLLOW-UP VISIT: CPT | Performed by: ORTHOPAEDIC SURGERY

## 2021-06-04 RX ORDER — SULFAMETHOXAZOLE AND TRIMETHOPRIM 800; 160 MG/1; MG/1
1 TABLET ORAL 2 TIMES DAILY
Qty: 20 TABLET | Refills: 0 | Status: SHIPPED | OUTPATIENT
Start: 2021-06-04 | End: 2021-06-14

## 2021-06-04 RX ORDER — OXYCODONE HYDROCHLORIDE AND ACETAMINOPHEN 5; 325 MG/1; MG/1
1 TABLET ORAL EVERY 6 HOURS PRN
Qty: 28 TABLET | Refills: 0 | Status: SHIPPED | OUTPATIENT
Start: 2021-06-04 | End: 2021-06-11

## 2021-06-04 NOTE — PROGRESS NOTES
2 weeks postop left nondisplaced intertrochanteric hip fracture reduction fixation with intramedullary laura. Overall she is doing well. She reports her pain is improving. She is ambulating better. She is benefiting from therapy. Physical exam: Incision healing nicely. Central area with mild hyperemia around the staples. Staples removed. No gross purulence. Patient is ambulating with a walker. She is neuro vas intact. X-rays in office today: AP pelvis, AP lateral views of the left hip were obtained demonstrating stable intramedullary laura fixation of the left hip. No interval displacement compared to intraoperative images. Impression office x-rays: Stable left hip fixation    Assessment: 2 weeks postop    Plan    Continue weightbearing as tolerated. Follow-up 1 month. Patient requested a refill on pain medication. Patient was also started on Bactrim given that she had some hyperemia around the central staples.   Follow-up with new x-rays

## 2021-06-14 DIAGNOSIS — S72.002A CLOSED LEFT HIP FRACTURE, INITIAL ENCOUNTER (HCC): Primary | ICD-10-CM

## 2021-06-14 RX ORDER — HYDROCODONE BITARTRATE AND ACETAMINOPHEN 5; 325 MG/1; MG/1
1 TABLET ORAL EVERY 6 HOURS PRN
Qty: 28 TABLET | Refills: 0 | Status: SHIPPED
Start: 2021-06-14 | End: 2021-08-13 | Stop reason: SDUPTHER

## 2021-06-14 NOTE — TELEPHONE ENCOUNTER
Patient is requesting a medication refill, OARRS complete. Patient is 3 weeks out from surgery of Lt hip ORIF. Patient was last seen on 6-4-21 and patients next appointment is 7-8-21. Patient was last given Percocet 5-325 q6hr PRN on 6-4-21.

## 2021-06-28 DIAGNOSIS — S72.002A CLOSED LEFT HIP FRACTURE, INITIAL ENCOUNTER (HCC): Primary | ICD-10-CM

## 2021-07-08 ENCOUNTER — TELEPHONE (OUTPATIENT)
Dept: ORTHOPEDIC SURGERY | Age: 51
End: 2021-07-08

## 2021-07-12 ENCOUNTER — EVALUATION (OUTPATIENT)
Dept: PHYSICAL THERAPY | Age: 51
End: 2021-07-12
Payer: COMMERCIAL

## 2021-07-12 DIAGNOSIS — S72.002G CLOSED LEFT HIP FRACTURE, WITH DELAYED HEALING, SUBSEQUENT ENCOUNTER: Primary | ICD-10-CM

## 2021-07-12 PROCEDURE — 97110 THERAPEUTIC EXERCISES: CPT | Performed by: PHYSICAL THERAPIST

## 2021-07-12 PROCEDURE — 97161 PT EVAL LOW COMPLEX 20 MIN: CPT | Performed by: PHYSICAL THERAPIST

## 2021-07-12 NOTE — PROGRESS NOTES
[address] [facility]   Phone: [phone]   Fax: [phone]           Date:  2021   Patient: Dee Horton  : 1970  MRN: [id number]  Referring Provider: MD Rocio Lowe [address] [place] [address]     Medical Diagnosis:     S72.002D (ICD-10-CM) - Closed left hip fracture, subsequent encounter ([facility]    SUBJECTIVE:     Onset date: 3/15/2021    Onset: fall    Surgical Date: 2021    Surgical Procedure: left nondisplaced intertrochanteric hip fracture reduction fixation with intramedullary laura    Mechanism of Injury: Pt reports that she originally fell carrying boxes into home in the middle of march resulting in L hip fracture. Surgical reduction performed 2021. She reports restriction c prolonged ambulation, lifting from floor to waist, and stairs. She reports OTC meds for pain control.     Previous PT: yes - helped home health     Medical Management for Current Problem: medications, OTC meds     Chief complaint: pain, edema, decreased motion, decreased mobility, weakness, inability / limited ability to use arm, inability / limited ability to use leg, difficulty walking, difficulty with stairs, limited ability to complete ADLs (feeding, dressing , bathing, transferring , walking), limited ability to complete IADLs (cooking, cleaning, laundry), limited ability to lift/carry/handle material, limited ability to complete home/outdoor chores/tasks, inability to participate in exercise regimen / fitness program, decreased endurance, decreased balance    Behavior: condition is getting better    Pain: waxing and waning  Current: /10     Best: 0/10     Worst:6/10    Symptom Type/Quality: sharp  Location[de-identified] Hip: left lateral side does not radiate         Provoking Activities/Positions: sitting, standing, walking, prolonged sitting, prolonged standing, bending, lifting/carrying/material handling, rising to standing, getting up from low positions Relieving Activitie/Positions: sitting    Disturbed Sleep: yes     Imaging results: No results found. Past Medical History:  Past Medical History:   Diagnosis Date    Cirrhosis ([address] 2013     Past Surgical History:   Procedure Laterality Date    HIP FRACTURE SURGERY Left 5/22/2021    LEFT HIP OPEN REDUCTION INTERNAL FIXATION performed by Fortunato Mejia MD at [address] Left     shoulder       Medications:   Current Outpatient Medications   Medication Sig Dispense Refill    aspirin EC 81 MG EC tablet Take 1 tablet by mouth 2 times daily for 28 days 56 tablet 0    traZODone (DESYREL) 150 MG tablet Take 150 mg by mouth nightly      buPROPion (WELLBUTRIN XL) 150 MG extended release tablet Take 150 mg by mouth every morning (Patient not taking: Reported on 6/4/2021)       No current facility-administered medications for this visit. Occupation: retired- massage therapist.     Exercise regimen: walking    Hobbies: dog, reading, getting together c friends, swimming    Patient Goals: pain relief, return to prior activity, get back to normal    Contraindications/Precautions: recent surgery- WBAT    OBJECTIVE:     Observations: well nourished female, normal affect    Inspection: normal orthopedic exam         Gait: antalgic gait, limp L LE    Functional Strength:   Able to toe walk, heel walk, and squat.     Range of Motion:    Joint/Motion:    Hip:  Right:   AROM: 120° Flexion,   50° Abduction,  45° ER, 40° IR    Left:   AROM: 100° Flexion,   35° Abduction,  30° ER, 30° IR      Knee:  Right:  AROM: WNL    Left:   AROM: WNL           Strength:    Trunk: mildly decreased  Hip:  Right: Flexion 4+/5,  Extension 4+/5, Abduction 4+/5, ER 4+/5, IR 4+/5    Left: Flexion 4-/5,  Extension 4-/5, Abduction 4-/5, ER 4-/5, IR 4-/5     Knee:   Right: Flexion 4+/5,  Extension 4+/5  Left: Flexion 4-/5,  Extension 4-/5    Palpation: Tender to palpation IT band, distal quad     Sensation: intact to light touch and temperature. Special Tests: NT d/t post op    Comments: good healing for post op    ASSESSMENT     Outcome Measure:   LEFS 45% limitation    Problems:    Pain reported 4-8/10   ROM decreased    Strength decreased   Decreased functional ability with walking, stairs, sitting, standing, work, ADLs as noted above, IADLs as noted above, use of right lower extremity, use of left lower extremity, limited tolerance to weightbearing tasks and weightbearing duration, bending, reaching, lifting, carrying, inability to participate in exercise regimen / fitness program    Reason for Skilled Care: Pt reports to PT following L hip fracture repair. Pt requires skilled care to improve global ROM, strength, stability, and overall fxn mobility needed for ADL, rec, and work activity. [x] There are no barriers affecting plan of care or recovery    [] Barriers to this patient's plan of care or recovery include.     Domestic Concerns:  [x] No  [] Yes:      Long Term goals (4-6 weeks)   Decrease reported pain to 0/10   Increase ° Flexion,   50° Abduction,  45° ER, 40° IR   Increase Strength to 4+/5 globally    Able to perform/complete the following functions/tasks: Pt will ambulate for 30 min s AD or limitation, able to negotiate a flight of stairs recip s pain, limitation, or HR, able to perform 10 STS transfers s pain or limitation or HHA       LEFS 10-25%   Independent with Home Exercise Programs    Rehab Potential: [x] Good  [] Fair  [] Poor    PLAN       Treatment Plan:   [x] Therapeutic Exercise  [x] Therapeutic Activity  [x] Neuromuscular Re-education   [] Gait Training  [] Balance Training  [] Aerobic conditioning  [] Manual Therapy  [] Massage/Fascial release   [] Work/Sport specific activities    [] Pain Neuroscience [x] Cold/hotpack  [] Vasocompression  [x] Electrical Stimulation  [] Lumbar/Cervical Traction  [] Ultrasound   [] Iontophoresis: 4 mg/mL Dexamethasone Sodium Phosphate 40-80 mAmin  [] Dry Needling      [x] Instruction in HEP      []  Medication allergies reviewed for use of Dexamethasone Sodium Phosphate 4mg/ml  with iontophoresis treatments. Patient is not allergic. The following CPT codes are likely to be used in the care of this patient: 82005 PT Evaluation: Low Complexity , 77538 PT Re-Evaluation , 94080 Therapeutic Exercise , 86553 Neuromuscular Re-Education , 70016 Therapeutic Activities , 25698 Manual Therapy , 69185 Group Therapy , 52195 Gait Training , 08241 Vasopneumatic Device , 27429 E-stim      Suggested Professional Referral: [x] No  [] Yes:     Patient Education:  [x] Plans/Goals, Risks/Benefits discussed  [x] Home exercise program  Method of Education: [x] Verbal  [x] Demo  [x] Written  Comprehension of Education:  [x] Verbalizes understanding. [x] Demonstrates understanding. [] Needs Review. [] Demonstrates/verbalizes understanding of HEP/Ed previously given. Frequency:  1-2 days per week for 4-6 weeks    Patient understands diagnosis/prognosis and consents to treatment, plan and goals: [x] Yes    [] No     Thank you for the opportunity to work with your patient. If you have questions or comments, please contact me at numbers listed above. Electronically signed by: Delmy Levin, PT DPT [id number]         Please sign Physician's Certification and return to: [address] [place] [address] [address]  Dept: [phone]  Dept Fax: [phone] PT DPT [id number]    [id number] Certification / Comments     Frequency/Duration 1-2 days per week for 4-6 weeks. Certification period from 7/12/2021  to 10/1/2021. I have reviewed the Plan of Care established for skilled therapy services and certify that the services are required and that they will be provided while the patient is under my care.     Physician's Comments/Revisions:               Physician's Printed Name: Physician's Signature:                                                               Date:

## 2021-07-12 NOTE — PROGRESS NOTES
[address] [facility]   Phone: [phone]   Fax: [phone]      Physical Therapy Daily Treatment Note    Date: 2021  Patient Name: Viry Lepe  : 1970   MRN: [id number]  DOInjury: 3/2021   DOSx: 2021  Referring Provider: MD Rocio Kumar [address] [place] [address]     Medical Diagnosis:     S72.002D (ICD-10-CM) - Closed left hip fracture, subsequent encounter [facility])    Outcome Measure: LEFS 45% limitation    S: see eval  O:   Time 220-255     Visit  Repeat outcome measure at mid point and end. Pain 4/10                       Modalities            Manual            Stretch      SKTC 4x30s L HEP TE   IT band supine      HS supine      Quad Prone      Exercise      Bike      bridging 3x10  HEP TE   Hip add iso supine 3x10 HEP TE   Supine clams 3x10 HEP TE         QS      SLR      SAQ      LAQ      Hamstring Curl       TG squats      TG calf raises      Step-ups - FWD      Step-ups - LAT      Step-ups - BWD        NMR To improve balance for safe community and home ambulation    Resisted walk      FWD      BKWD      lat      March      Side stepping      Retro walk      Heel to toe      A:  Tolerated well. Above added to written HEP and will perform daily.   P: Continue with rehab plan  Yomaira Calix, PT DPT, PT [id number]    Treatment Charges: Mins Units   Initial Evaluation 20 1   Re-Evaluation     Ther Exercise         TE 15 1   Manual Therapy     MT     Ther Activities        TA     Gait Training          GT     Neuro Re-education NR     Modalities     Non-Billable Service Time     Other     Total Time/Units 35 2

## 2021-07-20 ENCOUNTER — TREATMENT (OUTPATIENT)
Dept: PHYSICAL THERAPY | Age: 51
End: 2021-07-20
Payer: COMMERCIAL

## 2021-07-20 DIAGNOSIS — S72.002G CLOSED LEFT HIP FRACTURE, WITH DELAYED HEALING, SUBSEQUENT ENCOUNTER: Primary | ICD-10-CM

## 2021-07-20 PROCEDURE — 97110 THERAPEUTIC EXERCISES: CPT | Performed by: PHYSICAL THERAPIST

## 2021-07-20 PROCEDURE — 97112 NEUROMUSCULAR REEDUCATION: CPT | Performed by: PHYSICAL THERAPIST

## 2021-07-21 NOTE — PROGRESS NOTES
[address] [facility]   Phone: [phone]   Fax: [phone]      Physical Therapy Daily Treatment Note    Date: 2021  Patient Name: Santiago López  : 1970   MRN: [id number]  DOInjury: 3/2021   DOSx: 2021  Referring Provider: MD Rocio Lindsay [address] [place] [address]     Medical Diagnosis:     S72.002D (ICD-10-CM) - Closed left hip fracture, subsequent encounter ([facility]    Outcome Measure: LEFS 45% limitation    S: pt c/o experiencing severe Left hip pain for several days following initial eval & Tx session. O:   Time 9698-9639     Visit  Repeat outcome measure at mid point and end. Pain 4/10                       Modalities            Manual            Stretch      IT band supine      HS supine      Quad Prone      Exercise      Bike 5min Level 1  TE   glute bridging 2x10   NR   Supine hip add 2x10 Ball squeeze TE   Supine clamshells 2x10 Blue band TE   Side-lying clamshell 2x 10 L  Orange band NR   Side-lying hip abd 2x 10 L  NR   SLR-supine 2x 10  TE   Seated clamshell 2x 10 Blue band TE   LAQ 2x 10 2lb cuff weight TE   Hamstring Curl - seated 2x 10 Blue band TE   Seated marching 2x 10 Blue band TE   Seated marching 2x 10 2lb cuff weight TE   Seated ankle pumps 2x 10 2lb cuff weight TE   Step-ups - LAT      Step-ups - BWD        NMR To improve balance for safe community and home ambulation    Resisted walk      FWD      BKWD      lat      March      Side stepping      Retro walk      Heel to toe      A:  The pt experienced difficulty with today's exercises due to left hip pain. PT progressed today's exercise program to include new exercises mat & seated. P: Continue with rehab plan & progress to include standing exercises.      Ny Hall, PT OHPT [id number]    Treatment Charges: Mins Units   Initial Evaluation     Re-Evaluation     Ther Exercise         TE 33 2   Manual Therapy     MT     Ther Activities        TA     Gait Training          GT Neuro Re-education NR 12 1   Modalities     Non-Billable Service Time     Other     Total Time/Units 45 3

## 2021-07-26 ENCOUNTER — TREATMENT (OUTPATIENT)
Dept: PHYSICAL THERAPY | Age: 51
End: 2021-07-26
Payer: COMMERCIAL

## 2021-07-26 DIAGNOSIS — S72.002G CLOSED LEFT HIP FRACTURE, WITH DELAYED HEALING, SUBSEQUENT ENCOUNTER: Primary | ICD-10-CM

## 2021-07-26 PROCEDURE — 97530 THERAPEUTIC ACTIVITIES: CPT | Performed by: PHYSICAL THERAPIST

## 2021-07-26 PROCEDURE — 97110 THERAPEUTIC EXERCISES: CPT | Performed by: PHYSICAL THERAPIST

## 2021-07-26 NOTE — PROGRESS NOTES
[address] [facility]   Phone: [phone]   Fax: [phone]      Physical Therapy Daily Treatment Note    Date: 2021  Patient Name: Cyndee Lopez  : 1970   MRN: [id number]  DOInjury: 3/2021   DOSx: 2021  Referring Provider: MD Rocio Rosen [address] [place] [address]     Medical Diagnosis:     S72.002D (ICD-10-CM) - Closed left hip fracture, subsequent encounter ([facility]    Outcome Measure: LEFS 45% limitation    S: The pt c/o increased soreness this afternoon to PT & displays antalgic gait deviations. O:   Time 8902-3620     Visit 3/8 Repeat outcome measure at mid point and end. Pain /10                       Modalities            Manual            Stretch      IT band supine      HS supine      Quad Prone      Exercise      Bike 5min Level 1  TE   Seated clamshell 3x 10 Blue band TE   LAQ 3x 10 R & L 2lb cuff weight TE   Hamstring Curl - seated 3x 10 R & L Blue band TE   Seated marching 3x 10 R & L Blue band TE   Seated marching 3x 10 R & L 2lb cuff weight TE   Seated ankle pumps 3x 10 R & L 2lb cuff weight TE   Seated hip add 3x 10 Ball squeeze TE   Ball ankle dorsiflex, plantarflex, inversion, & eversion 3x 10 Blue band TE     NMR To improve balance for safe community and home ambulation    Resisted walk      FWD      BKWD      lat      Standing marching x30 R & L  TA   Standing heel raises x30  TA   Standing toe raises x30 L  TA   Standing leg curl x30 L  TA   A:  The pt progressed with today's Tx session to perform standing marching, heel raise, toe raises, & leg curls. P: Continue with rehab plan & progress to include standing exercises step-outs & step-ups.      Chuck Munguia, PT OHPT [id number]    Treatment Charges: Mins Units   Initial Evaluation     Re-Evaluation     Ther Exercise         TE 30 2   Manual Therapy     MT     Ther Activities        TA 15 1   Gait Training          GT     Neuro Re-education NR     Modalities     Non-Billable Service Time     Other     Total Time/Units 45 3

## 2021-08-02 ENCOUNTER — TREATMENT (OUTPATIENT)
Dept: PHYSICAL THERAPY | Age: 51
End: 2021-08-02
Payer: COMMERCIAL

## 2021-08-02 DIAGNOSIS — S72.002G CLOSED LEFT HIP FRACTURE, WITH DELAYED HEALING, SUBSEQUENT ENCOUNTER: Primary | ICD-10-CM

## 2021-08-02 PROCEDURE — 97110 THERAPEUTIC EXERCISES: CPT | Performed by: PHYSICAL THERAPIST

## 2021-08-02 PROCEDURE — 97112 NEUROMUSCULAR REEDUCATION: CPT | Performed by: PHYSICAL THERAPIST

## 2021-08-02 NOTE — PROGRESS NOTES
[address] [facility]   Phone: [phone]   Fax: [phone]      Physical Therapy Daily Treatment Note    Date: 2021  Patient Name: Rocky Seo  : 1970   MRN: [id number]  DOInjury: 3/2021   DOSx: 2021  Referring Provider: MD Rocio Rosario [address] [place] [address]     Medical Diagnosis:     S72.002D (ICD-10-CM) - Closed left hip fracture, subsequent encounter ([facility]    Outcome Measure: LEFS 45% limitation    S: The pt reports that she has an incr in soreness today d/t walking a lot this weekend. O:   Time 0248-4927     Visit  Repeat outcome measure at mid point and end. Pain /10                       Modalities            Manual            Stretch      IT band supine      HS supine      Quad Prone      Exercise      Bike 5min Level 1  TE   Seated clamshell 3x 10 Blue band TE   LAQ 3x 10 R & L 2lb cuff weight TE   Hamstring Curl - standing 3x 10 R & L Blue band NR   Seated marching 3x 10 R & L Blue band TE   2lb cuff weight TE   Standing SLE x20 B 2 lb flex/ext NR   Seated heel raises 3x 10 R & L 2lb cuff weight TE   Seated hip add 3x 10 Ball squeeze TE   Blue band TE     NMR To improve balance for safe community and home ambulation    Resisted walk      FWD      BKWD      lat       TA    TA    TA    TA   A:  The pt progressed today c standing functional stability exercises to improve endurance and exercise tolerance. She tolerated the session well but required rest breaks secondary to fatigue. P: Continue with rehab plan & progress to include standing exercises step-outs & step-ups.      Cortes Breen PT DPT [id number]    Treatment Charges: Mins Units   Initial Evaluation     Re-Evaluation     Ther Exercise         TE 30 2   Manual Therapy     MT     Ther Activities        TA     Gait Training          GT     Neuro Re-education NR 10 1   Modalities     Non-Billable Service Time     Other     Total Time/Units 40 3

## 2021-08-05 ENCOUNTER — TREATMENT (OUTPATIENT)
Dept: PHYSICAL THERAPY | Age: 51
End: 2021-08-05
Payer: COMMERCIAL

## 2021-08-05 DIAGNOSIS — S72.002G CLOSED LEFT HIP FRACTURE, WITH DELAYED HEALING, SUBSEQUENT ENCOUNTER: Primary | ICD-10-CM

## 2021-08-05 PROCEDURE — 97110 THERAPEUTIC EXERCISES: CPT

## 2021-08-05 NOTE — PROGRESS NOTES
[address] [facility]   Phone: [phone]   Fax: [phone]      Physical Therapy Daily Treatment Note    Date: 2021  Patient Name: Dee Horton  : 1970   MRN: [id number]  DOInjury: 3/2021   DOSx: 2021  Referring Provider: MD Rocio Lowe [address] [place] [address]     Medical Diagnosis:     S72.002D (ICD-10-CM) - Closed left hip fracture, subsequent encounter ([facility]    Outcome Measure: LEFS 45% limitation    S: The pt reports she has increased soreness upon arrival d/t walking around a lot. O:   Time 1510-7410     Visit  Repeat outcome measure at mid point and end. Pain 4/10                       Stretch      IT band supine      HS supine      Quad Prone      Exercise      Bike 5min Level 1  TE   glute bridging 2x10  Side-lying hip abd 2x 10 L SLR-supine 2x 10  TE   Seated clamshell 3x 10 Blue band TE   LAQ 3x 10 R & L 2lb cuff weight TE   Blue band NR   Seated marching 3x 10 R & L Blue band TE   2lb cuff weight TE   2 lb flex/ext NR   2lb cuff weight TE   Seated hip add 3x 10 Ball squeeze TE   Blue band TE          TA    TA    TA    TA                                                               A:  The pt progressed today c standing functional stability exercises to improve endurance and exercise tolerance. She tolerated the session well but required rest breaks secondary to fatigue. P: Continue with rehab plan & progress to include standing exercises step-outs & step-ups.    Bernita Dubin \[facility]\"" [id number]    Treatment Charges: Mins Units   Initial Evaluation     Re-Evaluation     Ther Exercise         TE 40 3   Manual Therapy     MT     Ther Activities        TA     Gait Training          GT     Neuro Re-education NR     Modalities     Non-Billable Service Time     Other     Total Time/Units 40 3

## 2021-08-09 ENCOUNTER — TREATMENT (OUTPATIENT)
Dept: PHYSICAL THERAPY | Age: 51
End: 2021-08-09
Payer: COMMERCIAL

## 2021-08-09 DIAGNOSIS — S72.002G CLOSED LEFT HIP FRACTURE, WITH DELAYED HEALING, SUBSEQUENT ENCOUNTER: Primary | ICD-10-CM

## 2021-08-09 PROCEDURE — 97530 THERAPEUTIC ACTIVITIES: CPT

## 2021-08-09 PROCEDURE — 97110 THERAPEUTIC EXERCISES: CPT

## 2021-08-09 NOTE — PROGRESS NOTES
[address] [facility]   Phone: [phone]   Fax: [phone]      Physical Therapy Daily Treatment Note    Date: 2021  Patient Name: Merritt Lou  : 1970   MRN: [id number]  DOInjury: 3/2021   DOSx: 2021  Referring Provider: MD Rocio Ibarra [address] [place] [address]     Medical Diagnosis:     S72.002D (ICD-10-CM) - Closed left hip fracture, subsequent encounter ([facility] [address]    Outcome Measure: LEFS 45% limitation    S: Pt reports she is sore this session. She states she walked around a lot this weekend. O:   Time 9355-2276     Visit  Repeat outcome measure at mid point and end. Pain 4/10                       Stretch      IT band supine      HS supine      Quad Prone      Exercise      Bike 5min Level 1  TE   glute bridging 2x10  NRSide-lying hip abd x10 L TESLR-supine x10 L  TE   Blue band TE   LAQ 3x 10 R & L 2lb cuff weight TE   Blue band NR   Seated marching 3x 10 R & L Blue band TE   2 lb flex/ext NR   2lb cuff weight TE   Seated hip add 3x 10 Ball squeeze TE   Blue band TE         Standing marching x30 R & L  TA   Standing heel raises x30  TA   Standing hip abduction  x30  TA          TA    TA         Standing step ups-fwd 2x10 4\" TA   Standing step ups-lat 2x10  4\" TA                                             A: Pt tolerated session well. She was able to progress c increased CKC activity this session. Progressed c step-up activity c moderate fatigue attained. She has a f/u apt scheduled c her surgeon on 21. Will continue to progress in order to further improve LE stability/strength needed for daily ADL activity. P: Continue with rehab plan & progress to include standing exercises step-outs & step-ups.    Lance Patella PTA [id number]    Treatment Charges: Mins Units   Initial Evaluation     Re-Evaluation     Ther Exercise         TE 30 2   Manual Therapy     MT     Ther Activities        TA 20 1   Gait Training          GT     Neuro Re-education NR     Modalities     Non-Billable Service Time     Other     Total Time/Units 50 3

## 2021-08-12 ENCOUNTER — OFFICE VISIT (OUTPATIENT)
Dept: ORTHOPEDIC SURGERY | Age: 51
End: 2021-08-12
Payer: COMMERCIAL

## 2021-08-12 VITALS — RESPIRATION RATE: 16 BRPM | HEIGHT: 64 IN | BODY MASS INDEX: 18.44 KG/M2 | WEIGHT: 108 LBS

## 2021-08-12 DIAGNOSIS — S72.002A CLOSED LEFT HIP FRACTURE, INITIAL ENCOUNTER (HCC): Primary | ICD-10-CM

## 2021-08-12 DIAGNOSIS — M70.62 TROCHANTERIC BURSITIS OF LEFT HIP: ICD-10-CM

## 2021-08-12 PROCEDURE — 99024 POSTOP FOLLOW-UP VISIT: CPT | Performed by: ORTHOPAEDIC SURGERY

## 2021-08-12 PROCEDURE — 20610 DRAIN/INJ JOINT/BURSA W/O US: CPT | Performed by: ORTHOPAEDIC SURGERY

## 2021-08-12 RX ORDER — TRIAMCINOLONE ACETONIDE 40 MG/ML
80 INJECTION, SUSPENSION INTRA-ARTICULAR; INTRAMUSCULAR ONCE
Status: COMPLETED | OUTPATIENT
Start: 2021-08-12 | End: 2021-08-12

## 2021-08-12 RX ADMIN — TRIAMCINOLONE ACETONIDE 80 MG: 40 INJECTION, SUSPENSION INTRA-ARTICULAR; INTRAMUSCULAR at 10:30

## 2021-08-12 NOTE — PATIENT INSTRUCTIONS
Patient Education        Hip Bursitis: Care Instructions  Your Care Instructions     Bursitis is inflammation of the bursa. A bursa is a small sac of fluid that cushions a joint and helps it move easily. A bursa sits between a bone in the hip and the muscles and tendons in the thigh and buttock. Injury or overuse of the hip can cause bursitis. Activities that can lead to bursitis include twisting and rapid joint movement. Bursitis can cause hip pain. Bursitis usually gets better if you avoid the activity that caused it. If pain lasts or gets worse despite home treatment, your doctor may draw fluid from the bursa through a needle. This may relieve your pain and help your doctor know if you have an infection. If so, your doctor will prescribe antibiotics. If you have inflammation only, you may get a corticosteroid shot to reduce swelling and pain. Sometimes surgery is needed to drain or remove the bursa. Follow-up care is a key part of your treatment and safety. Be sure to make and go to all appointments, and call your doctor if you are having problems. It's also a good idea to know your test results and keep a list of the medicines you take. How can you care for yourself at home? · Put ice or a cold pack on your hip for 10 to 20 minutes at a time. Put a thin cloth between the ice and your skin. · After 3 days of using ice, you may use heat on your hip. You can use a hot water bottle, a heating pad set on low, or a warm, moist towel. · Rest your hip. Stop any activities that cause pain. Switch to activities that do not stress your hip. · Take your medicines exactly as prescribed. Call your doctor if you think you are having a problem with your medicine. · Ask your doctor if you can take an over-the-counter pain medicine, such as acetaminophen (Tylenol), ibuprofen (Advil, Motrin), or naproxen (Aleve). Be safe with medicines. Read and follow all instructions on the label.   · To prevent stiffness, gently move the floor.  2. Put the ankle of your affected leg on your opposite thigh near your knee. 3. Use your hand to gently push your knee away from your body until you feel a gentle stretch around your hip. 4. Hold the stretch for 15 to 30 seconds. 5. Repeat 2 to 4 times. 6. Repeat steps 1 through 5, but this time use your hand to gently pull your knee toward your opposite shoulder. Iliotibial band stretch   1. Lean sideways against a wall. If you are not steady on your feet, hold on to a chair or counter. 2. Stand on the leg with the affected hip, with that leg close to the wall. Then cross your other leg in front of it. 3. Let your affected hip drop out to the side of your body and against wall. Then lean away from your affected hip until you feel a stretch. 4. Hold the stretch for 15 to 30 seconds. 5. Repeat 2 to 4 times. Straight-leg raises to the outside   1. Lie on your side, with your affected hip on top. 2. Tighten the front thigh muscles of your top leg to keep your knee straight. 3. Keep your hip and your leg straight in line with the rest of your body, and keep your knee pointing forward. Do not drop your hip back. 4. Lift your top leg straight up toward the ceiling, about 12 inches off the floor. Hold for about 6 seconds, then slowly lower your leg. 5. Repeat 8 to 12 times. Clamshell   1. Lie on your side, with your affected hip on top and your head propped on a pillow. Keep your feet and knees together and your knees bent. 2. Raise your top knee, but keep your feet together. Do not let your hips roll back. Your legs should open up like a clamshell. 3. Hold for 6 seconds. 4. Slowly lower your knee back down. Rest for 10 seconds. 5. Repeat 8 to 12 times. Follow-up care is a key part of your treatment and safety. Be sure to make and go to all appointments, and call your doctor if you are having problems.  It's also a good idea to know your test results and keep a list of the medicines you take.  Where can you learn more? Go to [email or web address] and sign in to your [email or web address] account. Enter K798 in the [facility] box to learn more about \"Hip Bursitis: Exercises. \"     If you do not have an account, please click on the \"Sign Up Now\" link. Current as of: November 16, 2020               Content Version: 12.9  © 2006-2021 [employer]wise[employer]. Care instructions adapted under license by [facility] ([facility]). If you have questions about a medical condition or this instruction, always ask your healthcare professional. [address] any warranty or liability for your use of this information.

## 2021-08-12 NOTE — PROGRESS NOTES
 Worried About [facility] in the Last Year:    [address] in the Last Year:    Transportation Needs:     Lack of Transportation (Medical):  Lack of Transportation (Non-Medical):    Physical Activity:     Days of Exercise per Week:     Minutes of Exercise per Session:    Stress:     Feeling of Stress :    Social Connections:     Frequency of Communication with Friends and Family:     Frequency of Social Gatherings with Friends and Family:     Attends Zoroastrianism Services:     Active Member of Clubs or Organizations:     Attends Club or Organization Meetings:     Marital Status:    Intimate Partner Violence:     Fear of Current or Ex-Partner:     Emotionally Abused:     Physically Abused:     Sexually Abused:      History reviewed. No pertinent family history. Skin: (-) rash,(-) psoriasis,(-) eczema, (-)skin cancer. Musculoskeletal: Left hip pain  Neurologic: (-) epilepsy, (-)seizures,(-) brain tumor,(-) TIA, (-)stroke, (-)headaches, (-)Parkinson disease,(-) memory loss, (-) LOC. Cardiovascular: (-) Chest pain, (-) swelling in legs/feet, (-) SOB, (-) cramping in legs/feet with walking. SUBJECTIVE:      Constitutional:    The patient is alert and oriented x 3, appears to be stated age and in no distress. Left lower extremity: Scars mature. Point tender directly over the trochanteric bursa. Some mild tenderness distally over the mid femur. Nontender about the knee. L2-S1 sensation intact. Motor testing 5/5. Neurovascular intact. Xrays: X-rays AP pelvis AP lateral views of the left hip were obtained today in the office. This demonstrates stable intramedullary nail fixation of the left hip. No new fracture dislocations. Impression office x-rays: Stable left hip fixation  Radiographic findings reviewed with patient      Impression:   Encounter Diagnoses   Name Primary?     Closed left hip fracture, initial encounter ([facility] [address] Yes    Trochanteric bursitis of left hip Plan:     Today's find were explained the patient. Discussed cortisone injection. Patient wished to have this. This provided. She is also advised with therapy to incorporate trochanteric bursa exercises and therapies. May follow-up every 3 months as needed for injections      Procedure Note Cortisone Injection for Trochanteric Bursitis    The left trochanteric bursa was identified as the injection site. The risk and benefits of a cortisone injection were explained and the patient consented to the injection. Under sterile conditions, the trochanteric burse was injected with a mixture of 80 mg of Kenalog and 8 mL of 0.25% Marcaine without complication. A sterile bandage was applied.

## 2021-08-13 DIAGNOSIS — S72.002A CLOSED LEFT HIP FRACTURE, INITIAL ENCOUNTER (HCC): ICD-10-CM

## 2021-08-13 RX ORDER — HYDROCODONE BITARTRATE AND ACETAMINOPHEN 5; 325 MG/1; MG/1
1 TABLET ORAL EVERY 6 HOURS PRN
Qty: 28 TABLET | Refills: 0 | Status: SHIPPED | OUTPATIENT
Start: 2021-08-13 | End: 2021-08-20

## 2021-08-13 NOTE — TELEPHONE ENCOUNTER
Patient is requesting a medication refill, OARRS complete. Patient is 12 weeks out from surgery of L hip ORIF. Patient was last seen on 8/12/21 and patients next appointment is 11/11/21. Patient was last given Norco 5-325mg q6 prn on 6/14/21.

## 2021-08-16 ENCOUNTER — TREATMENT (OUTPATIENT)
Dept: PHYSICAL THERAPY | Age: 51
End: 2021-08-16
Payer: COMMERCIAL

## 2021-08-16 DIAGNOSIS — S72.002G CLOSED LEFT HIP FRACTURE, WITH DELAYED HEALING, SUBSEQUENT ENCOUNTER: Primary | ICD-10-CM

## 2021-08-16 PROCEDURE — 97110 THERAPEUTIC EXERCISES: CPT | Performed by: PHYSICAL THERAPIST

## 2021-08-16 PROCEDURE — 97112 NEUROMUSCULAR REEDUCATION: CPT | Performed by: PHYSICAL THERAPIST

## 2021-08-16 NOTE — PROGRESS NOTES
[address] [facility]   Phone: [phone]   Fax: [phone]      Physical Therapy Daily Treatment Note    Date: 2021  Patient Name: Fermin Sigala  : 1970   MRN: [id number]  DOInjury: 3/2021   DOSx: 2021  Referring Provider: MD Rocio Huynh [address] [place] [address]     Medical Diagnosis:     S72.002D (ICD-10-CM) - Closed left hip fracture, subsequent encounter ([facility] [address]    Outcome Measure: LEFS 45% limitation    S: Pt reports that she returned to surgeon and reports she has greater trochanteric bursitis. She has requested an easier appt today. O:   Time [id number]     Visit  Repeat outcome measure at mid point and end. Pain 4/10                       Stretch      IT band supine 4x30s L  TE   HS supine 3x30s L  TE   Medial HS streth 4x30s L  TE   Exercise      Level 1  TE   NRSupine hip add 2x10 Ball squeeze TE     SLR 2x10 L flex/abd  NR   supine clamshell 3x10 DL, 2x10 B SL YTB NR   2lb cuff weight TE   Blue band NR   Blue band TE   2 lb flex/ext NR   2lb cuff weight TE   supine hip add 3x 10 Ball squeeze TE   Blue band TE          TA    TA    TA          TA    TA         4\" TA   4\" TA                                             A: Pt tolerated session well. Pts session was reduced in intensity per her request and hip stability/ stretching were added to reduce lateral hip pain. She tolerated the session well and will continue c PT for additional 2 visits. P: Continue with rehab plan & progress to include standing exercises step-outs & step-ups.    Tolu Rouse PT DPT [id number]    Treatment Charges: Mins Units   Initial Evaluation     Re-Evaluation     Ther Exercise         TE 30 2   Manual Therapy     MT     Ther Activities        TA     Gait Training          GT     Neuro Re-education NR 10 1   Modalities     Non-Billable Service Time     Other     Total Time/Units 40 3

## 2021-08-23 ENCOUNTER — TREATMENT (OUTPATIENT)
Dept: PHYSICAL THERAPY | Age: 51
End: 2021-08-23
Payer: COMMERCIAL

## 2021-08-23 DIAGNOSIS — S72.002G CLOSED LEFT HIP FRACTURE, WITH DELAYED HEALING, SUBSEQUENT ENCOUNTER: Primary | ICD-10-CM

## 2021-08-23 PROCEDURE — 97110 THERAPEUTIC EXERCISES: CPT | Performed by: PHYSICAL THERAPIST

## 2021-08-23 NOTE — PROGRESS NOTES
[address] [facility] [address] [facility]   Phone: [phone]   Fax: [phone]      Physical Therapy Daily Treatment Note    Date: 2021  Patient Name: Michael Velez  : 1970   MRN: [id number]  DOInjury: 3/2021   DOSx: 2021  Referring Provider: MD Rocio Weber [address] [place] [address]     Medical Diagnosis:     S72.002D (ICD-10-CM) - Closed left hip fracture, subsequent encounter [facility])    Outcome Measure: LEFS 45% limitation    S: Pt reports she worked in her garden this weekend and is sore today. She noted relief c supine stretching HEP.     O:   Time 308-448     Visit 8 Repeat outcome measure at mid point and end. Pain 4/10                       Stretch      IT band supine 3x30s L  TE   HS supine 3x30s L  TE   Medial HS streth 3x30s L  TE   Exercise      Level 1  TE   NRSupine hip add 2x10 Ball squeeze TE      NR   seated clamshell 3x10 DL, 2x10 B SL YTB NR   LAQ 3x 10 R & L YTB TE   Blue band NR   Seated marching 3x 10 R & L Blue band TE   2 lb flex/ext NR   2lb cuff weight TE         Blue band TE          TA    TA    TA          TA    TA         4\" TA   4\" TA                                             A: Pt tolerated session well. Pt was progressed today c general strength and ROM. She tolerated the session well c moderate fatigue and no pain. P: Continue with rehab plan & progress to include standing exercises step-outs & step-ups.    Jerman Cedeno PT DPT [id number]    Treatment Charges: Mins Units   Initial Evaluation     Re-Evaluation     Ther Exercise         TE 40 3   Manual Therapy     MT     Ther Activities        TA     Gait Training          GT     Neuro Re-education NR     Modalities     Non-Billable Service Time     Other     Total Time/Units 40 3

## 2021-08-30 ENCOUNTER — TREATMENT (OUTPATIENT)
Dept: PHYSICAL THERAPY | Age: 51
End: 2021-08-30

## 2021-10-06 NOTE — PROGRESS NOTES
[facility] [facility]   Phone: [phone]   Fax: [phone]    Discharge Summary    REASON FOR DISCHARGE: Pt went out of town for  and did not schedule additional appts. D/c d/t lapse of care. PATIENT EDUCATION/INSTRUCTIONS: continue HEP as instructed    RECOMMENDATIONS: call c questions or if additional services are warranted. Thank you for the opportunity to work with your patient. If you have questions or comments, please contact me at numbers listed above.       Dillon Wood [address] , DPT PT [id number] 10/6/2021

## 2021-10-25 ENCOUNTER — HOSPITAL ENCOUNTER (EMERGENCY)
Age: 51
Discharge: HOME | End: 2021-10-26
Payer: COMMERCIAL

## 2021-10-25 ENCOUNTER — APPOINTMENT (OUTPATIENT)
Dept: CT IMAGING | Age: 51
End: 2021-10-25
Payer: COMMERCIAL

## 2021-10-25 ENCOUNTER — APPOINTMENT (OUTPATIENT)
Dept: GENERAL RADIOLOGY | Age: 51
End: 2021-10-25
Payer: COMMERCIAL

## 2021-10-25 DIAGNOSIS — S13.4XXA WHIPLASH INJURY SYNDROME, INITIAL ENCOUNTER: ICD-10-CM

## 2021-10-25 DIAGNOSIS — S01.81XA FOREHEAD LACERATION, INITIAL ENCOUNTER: Primary | ICD-10-CM

## 2021-10-25 DIAGNOSIS — S09.90XA MINOR HEAD INJURY WITHOUT LOSS OF CONSCIOUSNESS, INITIAL ENCOUNTER: ICD-10-CM

## 2021-10-25 DIAGNOSIS — S62.616A CLOSED DISPLACED FRACTURE OF PROXIMAL PHALANX OF RIGHT LITTLE FINGER, INITIAL ENCOUNTER: ICD-10-CM

## 2021-10-25 DIAGNOSIS — S01.21XA LACERATION OF NOSE WITHOUT COMPLICATION, INITIAL ENCOUNTER: ICD-10-CM

## 2021-10-25 PROCEDURE — 99283 EMERGENCY DEPT VISIT LOW MDM: CPT

## 2021-10-25 PROCEDURE — 72125 CT NECK SPINE W/O DYE: CPT

## 2021-10-25 PROCEDURE — 70450 CT HEAD/BRAIN W/O DYE: CPT

## 2021-10-25 PROCEDURE — 73140 X-RAY EXAM OF FINGER(S): CPT

## 2021-10-25 PROCEDURE — 12011 RPR F/E/E/N/L/M 2.5 CM/<: CPT

## 2021-10-25 RX ORDER — LIDOCAINE HYDROCHLORIDE AND EPINEPHRINE 10; 10 MG/ML; UG/ML
20 INJECTION, SOLUTION INFILTRATION; PERINEURAL ONCE
Status: COMPLETED | OUTPATIENT
Start: 2021-10-25 | End: 2021-10-26

## 2021-10-25 RX ORDER — ACETAMINOPHEN 325 MG/1
650 TABLET ORAL ONCE
Status: COMPLETED | OUTPATIENT
Start: 2021-10-25 | End: 2021-10-26

## 2021-10-25 ASSESSMENT — PAIN SCALES - GENERAL: PAINLEVEL_OUTOF10: 10

## 2021-10-25 ASSESSMENT — PAIN DESCRIPTION - PAIN TYPE: TYPE: ACUTE PAIN

## 2021-10-26 VITALS
SYSTOLIC BLOOD PRESSURE: 142 MMHG | RESPIRATION RATE: 18 BRPM | BODY MASS INDEX: 17.93 KG/M2 | TEMPERATURE: 97.8 F | HEART RATE: 113 BPM | DIASTOLIC BLOOD PRESSURE: 96 MMHG | HEIGHT: 64 IN | WEIGHT: 105 LBS | OXYGEN SATURATION: 98 %

## 2021-10-26 PROCEDURE — 6370000000 HC RX 637 (ALT 250 FOR IP): Performed by: PHYSICIAN ASSISTANT

## 2021-10-26 PROCEDURE — 2500000003 HC RX 250 WO HCPCS: Performed by: PHYSICIAN ASSISTANT

## 2021-10-26 RX ORDER — HYDROCODONE BITARTRATE AND ACETAMINOPHEN 5; 325 MG/1; MG/1
1 TABLET ORAL EVERY 6 HOURS PRN
Qty: 10 TABLET | Refills: 0 | Status: SHIPPED | OUTPATIENT
Start: 2021-10-26 | End: 2021-12-16 | Stop reason: SDUPTHER

## 2021-10-26 RX ADMIN — ACETAMINOPHEN 650 MG: 325 TABLET ORAL at 00:01

## 2021-10-26 RX ADMIN — LIDOCAINE HYDROCHLORIDE,EPINEPHRINE BITARTRATE 20 ML: 10; .01 INJECTION, SOLUTION INFILTRATION; PERINEURAL at 00:01

## 2021-10-26 ASSESSMENT — PAIN DESCRIPTION - DESCRIPTORS: DESCRIPTORS: ACHING

## 2021-10-26 ASSESSMENT — PAIN DESCRIPTION - FREQUENCY: FREQUENCY: INTERMITTENT

## 2021-10-26 ASSESSMENT — PAIN SCALES - GENERAL
PAINLEVEL_OUTOF10: 8
PAINLEVEL_OUTOF10: 9

## 2021-10-26 ASSESSMENT — PAIN DESCRIPTION - LOCATION: LOCATION: FACE

## 2021-10-26 NOTE — ED PROVIDER NOTES
[address] [facility]  Department of Emergency Medicine   ED  Encounter Note  Admit Date/RoomTime: 10/25/2021 11:21 PM  ED Room: 36/36    NAME: Enoc Alicea  : 1970  MRN: [id number]     Chief Complaint:  Laceration (left frontal aspect of head, pt states that she had a few drinks tonight at dinner, came home, was getting dressed, lost balance and hit head, - loc, - thinners), Hand Injury (right 5th digit), and Neck Pain    History of Present Illness         Enoc Alicea is a 45 y.o. old female who presents to the emergency department by private vehicle, for head injury which occured a few minute(s) prior to arrival. The complaint is due to a fall from stumbling while at home. Loss of consciousness did not occur. The injury has been associated with headache and laceration and denies any confusion, abdominal pain, back pain, chest pain, blurred vision, diplopia, loss of vision, slurred speech, focal weakness, focal sensory loss, clumsiness, nausea, vomiting, incontinence or seizure activity. Previous head injury: no.  Since onset the symptoms have been mild in degree. Her pain is aggraveated by nothing and relieved by nothing. She takes no blood thinning agents. The patients tetanus status is up to date. ROS   Pertinent positives and negatives are stated within HPI, all other systems reviewed and are negative. Past Medical History:  has a past medical history of Cirrhosis ([facility] [address] Surgical History:  has a past surgical history that includes joint replacement (Left) and Hip fracture surgery (Left, 2021). Social History:  reports that she has been smoking. She has been smoking about 0.25 packs per day. She has never used smokeless tobacco. She reports current alcohol use of about 2.0 standard drinks of alcohol per week. She reports current drug use. Drug: Marijuana. Family History: family history is not on file.      Allergies: Patient has no known allergies. Physical Exam   Oxygen Saturation Interpretation: Normal.        ED Triage Vitals [10/25/21 2247]   BP Temp Temp src Pulse Resp SpO2 Height Weight   (!) 170/91 98.6 °F (37 °C) -- 88 16 98 % 5' 4\" (1.626 m) 105 lb (47.6 kg)         Constitutional: Level of Consciousness: Awake and alert, cooperative. ETOH: Yes. Distress: none. Head:  Traumatic:  yes: left forehead laceration 2.5 cm. Scalp Tenderness:  none. Deformity: no.               Skin:  Laceration small left bridge of nose laceration 0.8 cm. Eyes:  PERRL, EOMI. No periorbital ecchymoses. Conjunctiva: normal.  Ears:  External ears without lesions. Tympanic membranes normal in appearance bilaterally. Throat:  Airway patient. Neck: Range of motion not performed. Patient is in c-collar she is tender in the midline and paravertebral tenderness which is greater than midline tenderness  Chest:  Symmetrical without visible rash or tenderness. Respiratory:  Clear to auscultation and breath sounds equal.  CV:  Regular rate and rhythm, normal heart sounds, without pathological murmurs. GI:  Abdomen Soft, nontender. No abrasions, ecchymoses, or lacerations. Back:  No costovertebral, paravertebral, intervertebral, or vertebral tenderness or spasm. Integument:  No abrasions, ecchymoses, or lacerations unless noted elsewhere. Extremities swelling and bruising of the right small finger. Pain particularly over the PIP and MCP joint. Normal, painless range of motion. No neurovascular deficit. Neurological:  Oriented x 3,  GCS15. Motor functions intact. No pronator drift, no past-pointing, rapid motions intact. Lab / Imaging Results   (All laboratory and radiology results have been personally reviewed by myself)  Labs:  No results found for this visit on 10/25/21. Imaging: All Radiology results interpreted by Radiologist unless otherwise noted.   CT HEAD WO CONTRAST   Final Result   No acute intracranial abnormality. No acute fracture or subluxation in the cervical spine. Thyroid nodules, amenable to further evaluation by ultrasound         CT CERVICAL SPINE WO CONTRAST   Final Result   No acute intracranial abnormality. No acute fracture or subluxation in the cervical spine. Thyroid nodules, amenable to further evaluation by ultrasound         XR FINGER RIGHT (MIN 2 VIEWS)   Final Result   Fracture involving 5th proximal phalanx, as above. ED Course / Medical Decision Making     Medications   lidocaine-EPINEPHrine 1 %-1:[id number] injection 20 mL (20 mLs IntraDERmal Given 10/26/21 0001)   acetaminophen (TYLENOL) tablet 650 mg (650 mg Oral Given 10/26/21 0001)        Re-examination:  10/25/21       Time: 1 I will really did not help patient's pain however she is intoxicated and felt comfortable giving her anything stronger at this time. She accepts and understands. Consult(s):   None    Procedure(s):  PROCEDURE NOTE  10/26/21       Time: 0100    LACERATION REPAIR  Risks, benefits and alternatives (for applicable procedures below) described. Performed By: Lizzy Leos PA-C. Informed consent: Verbal consent obtained. The patient and spouse was counseled regarding the procedure in person, it's indications, risks, potential complications and alternatives and any questions were answered. Verbal consent was obtained. Laceration #: 1. Location: left forehead/medial eyebrow, linear, vertically oriented, perfectly straight  Length: 2.5 cm. The wound area was irrigated with sterile saline, cleansend with shur-clens and draped in a sterile fashion. Local Anesthesia:  obtained with Lidocaine 1% with epinephrine. The wound was explored with the following results: Thickness: full thickness. no foreign body or tendon injury seen. Debridement: None. Undermining: None. Wound Margins Revised: None. Flaps Aligned: yes.   The wound was closed with 5-0 Vicryl, and a running subcuticular suture completely buried and then covered with 2 Steri-Strips. Dressing:  no dressing required. Additional laceration on the nose was cleaned in typical fashion. No anesthesia was required as it was only 2 to 3 mm in length and was closed with Dermabond . I also placed a static splint on the right small finger extending beyond the finger into the base of the hand. Patient has a proximal phalanx spiral fracture. MDM:   Patient presents to emergency after a fall after stumbling at home hitting her head on the bedroom door. Laceration was sustained. Patient is intoxicated therefore imaging was performed. Head and neck CT negative for fracture. Laceration was repaired with a buried suture, running subcuticular of 5-0 Vicryl. Laceration on the nose very small was repaired with Dermabond adhesive. Patient had a fractured fifth proximal phalanx which I splinted. Wound care instructions and minor head injury precautions were provided to family. Follow-up with orthopedic surgery and primary care for recheck. Plan of Care/Counseling:  Vianca Borrero PA-C reviewed today's visit with the patient and spouse / life partner in addition to providing specific details for the plan of care and counseling regarding the diagnosis and prognosis. Questions are answered at this time and are agreeable with the plan. Assessment      1. Forehead laceration, initial encounter    2. Laceration of nose without complication, initial encounter    3. Closed displaced fracture of proximal phalanx of right little finger, initial encounter    4. Whiplash injury syndrome, initial encounter    5. Minor head injury without loss of consciousness, initial encounter      Plan   Discharged home.   Patient condition is stable    New Medications     New Prescriptions    HYDROCODONE-ACETAMINOPHEN (NORCO) 5-325 MG PER TABLET    Take 1 tablet by mouth every 6 hours as needed for Pain for up to 3 days. Electronically signed by Wendy Spears PA-C   DD: 10/25/21  **This report was transcribed using voice recognition software. Every effort was made to ensure accuracy; however, inadvertent computerized transcription errors may be present.   END OF ED PROVIDER NOTE         Wendy Spears PA-C  10/26/21 0139

## 2021-11-05 ENCOUNTER — TELEPHONE (OUTPATIENT)
Dept: ORTHOPEDIC SURGERY | Age: 51
End: 2021-11-05

## 2021-11-05 DIAGNOSIS — S69.91XA FINGER INJURY, RIGHT, INITIAL ENCOUNTER: Primary | ICD-10-CM

## 2021-11-08 ENCOUNTER — PREP FOR PROCEDURE (OUTPATIENT)
Dept: ORTHOPEDIC SURGERY | Age: 51
End: 2021-11-08

## 2021-11-08 ENCOUNTER — OFFICE VISIT (OUTPATIENT)
Dept: ORTHOPEDIC SURGERY | Age: 51
End: 2021-11-08
Payer: COMMERCIAL

## 2021-11-08 VITALS — RESPIRATION RATE: 20 BRPM | BODY MASS INDEX: 17.58 KG/M2 | WEIGHT: 103 LBS | HEIGHT: 64 IN

## 2021-11-08 DIAGNOSIS — S62.616A CLOSED DISPLACED FRACTURE OF PROXIMAL PHALANX OF RIGHT LITTLE FINGER, INITIAL ENCOUNTER: ICD-10-CM

## 2021-11-08 DIAGNOSIS — M25.511 ACUTE PAIN OF RIGHT SHOULDER: Primary | ICD-10-CM

## 2021-11-08 DIAGNOSIS — S42.031A DISPLACED FRACTURE OF LATERAL END OF RIGHT CLAVICLE, INITIAL ENCOUNTER FOR CLOSED FRACTURE: ICD-10-CM

## 2021-11-08 PROCEDURE — 99214 OFFICE O/P EST MOD 30 MIN: CPT | Performed by: ORTHOPAEDIC SURGERY

## 2021-11-08 PROCEDURE — 23500 CLTX CLAVICULAR FX W/O MNPJ: CPT | Performed by: ORTHOPAEDIC SURGERY

## 2021-11-08 RX ORDER — SODIUM CHLORIDE 0.9 % (FLUSH) 0.9 %
5-40 SYRINGE (ML) INJECTION EVERY 12 HOURS SCHEDULED
Status: CANCELLED | OUTPATIENT
Start: 2021-11-08

## 2021-11-08 RX ORDER — SODIUM CHLORIDE 9 MG/ML
INJECTION, SOLUTION INTRAVENOUS CONTINUOUS
Status: CANCELLED | OUTPATIENT
Start: 2021-11-08

## 2021-11-08 RX ORDER — SODIUM CHLORIDE 0.9 % (FLUSH) 0.9 %
5-40 SYRINGE (ML) INJECTION PRN
Status: CANCELLED | OUTPATIENT
Start: 2021-11-08

## 2021-11-08 RX ORDER — SODIUM CHLORIDE 9 MG/ML
25 INJECTION, SOLUTION INTRAVENOUS PRN
Status: CANCELLED | OUTPATIENT
Start: 2021-11-08

## 2021-11-08 NOTE — PROGRESS NOTES
Department of Orthopedic Surgery  History and Physical      CHIEF COMPLAINT:  Right shoulder and right small finger pain    HISTORY OF PRESENT ILLNESS:                The patient is a 45 y.o. female who presents with the above CC. Pt is RHD and does not work. In regards to her shoulder, she has had right shoulder pain located at the far end of her clavicle since a fall in September. She had XRays taken at [facility] at that time that demonstrated a possible distal clavicle fracture. She has not seen an orthopedic surgeon since the injury, but does have a CT scan under review from her insurance. She states the pain is improving since the fall but remains painful with certain activities. In regards to her small finger, she had a fall 2 weeks ago when she hurt her right small digit, however she cannot recall exact mechanism of injury. She was seen in the ED at the time, when she was placed in a splint. She has remained in the splint since. Pain and swelling is improving but still present and she notices a deformity to the digit. Denies any new numbness or tingling. Denies other injuries    Past Medical History:        Diagnosis Date    Cirrhosis ([facility] [address] 2013     Past Surgical History:        Procedure Laterality Date    HIP FRACTURE SURGERY Left 5/22/2021    LEFT HIP OPEN REDUCTION INTERNAL FIXATION performed by Nohemi Salvador MD at [address] [facility] Left     shoulder     Current Medications:   No current facility-administered medications for this visit. Allergies:  Patient has no known allergies. Social History:   TOBACCO:   reports that she has been smoking. She has been smoking about 0.25 packs per day. She has never used smokeless tobacco.  ETOH:   reports current alcohol use of about 2.0 standard drinks of alcohol per week. DRUGS:   reports current drug use. Drug: Marijuana David Mustard). ACTIVITIES OF DAILY LIVING:    OCCUPATION:    Family History:   History reviewed.  No pertinent family history. REVIEW OF SYSTEMS:  CONSTITUTIONAL:  negative  HEENT:  negative  RESPIRATORY:  negative  CARDIOVASCULAR:  negative  GASTROINTESTINAL:  negative  MUSCULOSKELETAL:  Positive for pain to the right shoulder and right small digit  NEUROLOGICAL:  negative  BEHAVIOR/PSYCH:  negative    PHYSICAL EXAM:    VITALS:  Resp 20   Ht 5' 4\" (1.626 m)   Wt 103 lb (46.7 kg)   BMI 17.68 kg/m²   CONSTITUTIONAL:  awake, alert, cooperative, no apparent distress, and appears stated age  EYES:  Lids and lashes normal, pupils equal, round and reactive to light, extra ocular muscles intact, sclera clear, conjunctiva normal  ENT:  Normocephalic, without obvious abnormality, atraumatic, sinuses nontender on palpation, external ears without lesions, oral pharynx with moist mucus membranes, tonsils without erythema or exudates, gums normal and good dentition. NECK:  Supple, symmetrical, trachea midline, no adenopathy, thyroid symmetric, not enlarged and no tenderness, skin normal  LUNGS:  CTA  CARDIOVASCULAR:  2+ radial pulses, extremities warm and well perfused  ABDOMEN:  NTTP  CHEST:  Atraumatic   GENITAL/URINARY:  deferred  NEUROLOGIC:  Awake, alert, oriented to name, place and time. Cranial nerves II-XII are grossly intact. Motor is 5 out of 5 bilaterally. Sensory is intact.  gait is normal.  MUSCULOSKELETAL:    · RUE:   · Skin intact circumferentially. Swelling with ecchymosis to the small digit. · +TTP about the distal clavicle as well as small digit. · Full range of motion about the shoulder. Negative drop arm test.  Motor testing 5/5 grossly. Rotator cuff strength full.   · Rotational deformity with ulnar deviation of the small digit  · Compartments soft and compressible  · +AIN/PIN/Ulnar nerve function intact grossly  · +Radial pulse, Brisk Cap refill, hand warm and perfused  · Sensation intact to touch in radial/ulnar/median nerve distributions to hand        DATA:    CBC:   Lab Results   Component Value Date WBC 5.3 05/22/2021    RBC 3.73 05/22/2021    HGB 12.8 05/22/2021    HCT 39.9 05/22/2021    .0 05/22/2021    MCH 34.3 05/22/2021    MCHC 32.1 05/22/2021    RDW 14.0 05/22/2021     05/22/2021    MPV 10.2 05/22/2021     PT/INR:    Lab Results   Component Value Date    PROTIME 14.1 05/22/2021    INR 1.3 05/22/2021       Radiology Review:  XR Right finger PA, oblique and lateral taken today and reviewed with the patient as well as compared to previous right finger xray from 10/25/21 that demonstrates a comminuted proximal phalanx fracture with ulnar deviation and rotation malalignment and shortening. Impression: Right small digit proximal phalanx fracture closed displaced    XR R shoulder 3 views AP, Scapular Y and axillary views taken today and reviewed with the patient demonstrating distal clavicle fracture with abundant callous formation. Impression: Right distal clavicle with callus formation    IMPRESSION:  · Right small digit proximal phalanx fracture  · Right distal clavicle fracture  · Cirrhosis    PLAN:  In regards to the distal clavicle fracture, discussed with patient radiological findings, prognosis as well as potential complications. It is currently healing well and expected to continue as such. We will continue with nonoperative treatment. In regards to her small digit phalangeal fracture, she will need operative intervention to restore alignment and rotation. We will attempt closed reduction with percutaneous pinning with possible open reduction and internal fixation if needed. Risks and benefits were discussed with the patient she would like to proceed. I have seen and evaluated the patient and agree with the above assessment and plan on today's visit.  I have performed the key components of the history and physical examination with significant findings of right lateral third clavicle fracture with callus formation as well as a close displaced little finger proximal phalanx

## 2021-11-08 NOTE — PROGRESS NOTES
Have you been tested for COVID  No vaccinated        Have you been told you were positive for COVID No  Have you had any known exposure to someone that is positive for COVID No  Do you have a cough                   No              Do you have shortness of breath No                 Do you have a sore throat            No                Are you having chills                    No                Are you having muscle aches. No                    Please come to the hospital wearing a mask and have your significant other wear a mask as well. Both of you should check your temperature before leaving to come here,  if it is 100 or higher please call [phone] for instruction.

## 2021-11-08 NOTE — PROGRESS NOTES
[facility] PRE-ADMISSION TESTING INSTRUCTIONS    The Preadmission Testing patient is instructed accordingly using the following criteria (check applicable):    ARRIVAL INSTRUCTIONS:  [x] Parking the day of Surgery is located in the Main Entrance lot. Upon entering the door, make an immediate right to the surgery reception desk    [x] Bring photo ID and insurance card    [] Bring in a copy of Living will or Durable Power of  papers. [x] Please be sure to arrange for responsible adult to provide transportation to and from the hospital    [x] Please arrange for responsible adult to be with you for the 24 hour period post procedure due to having anesthesia      GENERAL INSTRUCTIONS:    [x] Nothing by mouth after midnight, including gum, candy, mints or water    [x] You may brush your teeth, but do not swallow any water    [] Take medications as instructed with 1-2 oz of water    [] Stop herbal supplements and vitamins 5 days prior to procedure    [] Follow preop dosing of blood thinners per physician instructions    [] Take 1/2 dose of evening insulin, but no insulin after midnight    [] No oral diabetic medications after midnight    [] If diabetic and have low blood sugar or feel symptomatic, take 1-2oz apple juice only    [] Bring inhalers day of surgery    [] Bring C-PAP/ Bi-Pap day of surgery    [] Bring urine specimen day of surgery    [x] Shower or bath with soap, lather and rinse well, AM of Surgery, no lotion, powders or creams to surgical site    [] Follow bowel prep as instructed per surgeon    [x] No tobacco products within 24 hours of surgery     [x] No alcohol or illegal drug use within 24 hours of surgery.     [x] Jewelry, body piercing's, eyeglasses, contact lenses and dentures are not permitted into surgery (bring cases)      [x] Please do not wear any nail polish, make up or hair products on the day of surgery    [x] You can expect a call the business day prior to

## 2021-11-09 ENCOUNTER — ANESTHESIA (OUTPATIENT)
Dept: OPERATING ROOM | Age: 51
End: 2021-11-09
Payer: COMMERCIAL

## 2021-11-09 ENCOUNTER — ANESTHESIA EVENT (OUTPATIENT)
Dept: OPERATING ROOM | Age: 51
End: 2021-11-09
Payer: COMMERCIAL

## 2021-11-09 ENCOUNTER — HOSPITAL ENCOUNTER (OUTPATIENT)
Dept: GENERAL RADIOLOGY | Age: 51
Setting detail: OUTPATIENT SURGERY
Discharge: HOME | End: 2021-11-11
Attending: ORTHOPAEDIC SURGERY
Payer: COMMERCIAL

## 2021-11-09 ENCOUNTER — HOSPITAL ENCOUNTER (OUTPATIENT)
Age: 51
Setting detail: OUTPATIENT SURGERY
Discharge: HOME | End: 2021-11-09
Attending: ORTHOPAEDIC SURGERY | Admitting: ORTHOPAEDIC SURGERY
Payer: COMMERCIAL

## 2021-11-09 VITALS
TEMPERATURE: 97 F | SYSTOLIC BLOOD PRESSURE: 136 MMHG | DIASTOLIC BLOOD PRESSURE: 66 MMHG | HEIGHT: 64 IN | BODY MASS INDEX: 17.58 KG/M2 | WEIGHT: 103 LBS | HEART RATE: 77 BPM | RESPIRATION RATE: 20 BRPM | OXYGEN SATURATION: 98 %

## 2021-11-09 VITALS — OXYGEN SATURATION: 98 % | SYSTOLIC BLOOD PRESSURE: 103 MMHG | DIASTOLIC BLOOD PRESSURE: 66 MMHG

## 2021-11-09 DIAGNOSIS — R52 PAIN: ICD-10-CM

## 2021-11-09 DIAGNOSIS — G89.18 POST-OPERATIVE PAIN: Primary | ICD-10-CM

## 2021-11-09 PROCEDURE — 2709999900 HC NON-CHARGEABLE SUPPLY: Performed by: ORTHOPAEDIC SURGERY

## 2021-11-09 PROCEDURE — 3700000000 HC ANESTHESIA ATTENDED CARE: Performed by: ORTHOPAEDIC SURGERY

## 2021-11-09 PROCEDURE — 2580000003 HC RX 258: Performed by: NURSE ANESTHETIST, CERTIFIED REGISTERED

## 2021-11-09 PROCEDURE — 3700000001 HC ADD 15 MINUTES (ANESTHESIA): Performed by: ORTHOPAEDIC SURGERY

## 2021-11-09 PROCEDURE — 2500000003 HC RX 250 WO HCPCS: Performed by: NURSE ANESTHETIST, CERTIFIED REGISTERED

## 2021-11-09 PROCEDURE — 87081 CULTURE SCREEN ONLY: CPT

## 2021-11-09 PROCEDURE — 7100000011 HC PHASE II RECOVERY - ADDTL 15 MIN: Performed by: ORTHOPAEDIC SURGERY

## 2021-11-09 PROCEDURE — 6360000002 HC RX W HCPCS: Performed by: PHYSICIAN ASSISTANT

## 2021-11-09 PROCEDURE — 6360000002 HC RX W HCPCS: Performed by: NURSE ANESTHETIST, CERTIFIED REGISTERED

## 2021-11-09 PROCEDURE — 3600000012 HC SURGERY LEVEL 2 ADDTL 15MIN: Performed by: ORTHOPAEDIC SURGERY

## 2021-11-09 PROCEDURE — 3600000002 HC SURGERY LEVEL 2 BASE: Performed by: ORTHOPAEDIC SURGERY

## 2021-11-09 PROCEDURE — C1769 GUIDE WIRE: HCPCS | Performed by: ORTHOPAEDIC SURGERY

## 2021-11-09 PROCEDURE — 2720000010 HC SURG SUPPLY STERILE: Performed by: ORTHOPAEDIC SURGERY

## 2021-11-09 PROCEDURE — 7100000010 HC PHASE II RECOVERY - FIRST 15 MIN: Performed by: ORTHOPAEDIC SURGERY

## 2021-11-09 PROCEDURE — 3209999900 FLUORO FOR SURGICAL PROCEDURES

## 2021-11-09 PROCEDURE — 6360000002 HC RX W HCPCS: Performed by: ANESTHESIOLOGY

## 2021-11-09 PROCEDURE — C1713 ANCHOR/SCREW BN/BN,TIS/BN: HCPCS | Performed by: ORTHOPAEDIC SURGERY

## 2021-11-09 PROCEDURE — 2500000003 HC RX 250 WO HCPCS: Performed by: ORTHOPAEDIC SURGERY

## 2021-11-09 PROCEDURE — 6370000000 HC RX 637 (ALT 250 FOR IP): Performed by: ANESTHESIOLOGY

## 2021-11-09 PROCEDURE — 26735 TREAT FINGER FRACTURE EACH: CPT | Performed by: ORTHOPAEDIC SURGERY

## 2021-11-09 DEVICE — IMPLANTABLE DEVICE: Type: IMPLANTABLE DEVICE | Site: HAND | Status: FUNCTIONAL

## 2021-11-09 RX ORDER — SODIUM CHLORIDE 9 MG/ML
25 INJECTION, SOLUTION INTRAVENOUS PRN
Status: DISCONTINUED | OUTPATIENT
Start: 2021-11-09 | End: 2021-11-09 | Stop reason: HOSPADM

## 2021-11-09 RX ORDER — LIDOCAINE HYDROCHLORIDE 10 MG/ML
INJECTION, SOLUTION EPIDURAL; INFILTRATION; INTRACAUDAL; PERINEURAL PRN
Status: DISCONTINUED | OUTPATIENT
Start: 2021-11-09 | End: 2021-11-09 | Stop reason: ALTCHOICE

## 2021-11-09 RX ORDER — MIDAZOLAM HYDROCHLORIDE 1 MG/ML
INJECTION INTRAMUSCULAR; INTRAVENOUS PRN
Status: DISCONTINUED | OUTPATIENT
Start: 2021-11-09 | End: 2021-11-09 | Stop reason: SDUPTHER

## 2021-11-09 RX ORDER — PROPOFOL 10 MG/ML
INJECTION, EMULSION INTRAVENOUS PRN
Status: DISCONTINUED | OUTPATIENT
Start: 2021-11-09 | End: 2021-11-09 | Stop reason: SDUPTHER

## 2021-11-09 RX ORDER — SODIUM CHLORIDE 9 MG/ML
INJECTION, SOLUTION INTRAVENOUS CONTINUOUS PRN
Status: DISCONTINUED | OUTPATIENT
Start: 2021-11-09 | End: 2021-11-09 | Stop reason: SDUPTHER

## 2021-11-09 RX ORDER — SODIUM CHLORIDE 0.9 % (FLUSH) 0.9 %
5-40 SYRINGE (ML) INJECTION EVERY 12 HOURS SCHEDULED
Status: DISCONTINUED | OUTPATIENT
Start: 2021-11-09 | End: 2021-11-09 | Stop reason: HOSPADM

## 2021-11-09 RX ORDER — SODIUM CHLORIDE 0.9 % (FLUSH) 0.9 %
5-40 SYRINGE (ML) INJECTION PRN
Status: DISCONTINUED | OUTPATIENT
Start: 2021-11-09 | End: 2021-11-09 | Stop reason: HOSPADM

## 2021-11-09 RX ORDER — OXYCODONE HYDROCHLORIDE AND ACETAMINOPHEN 5; 325 MG/1; MG/1
1 TABLET ORAL
Status: COMPLETED | OUTPATIENT
Start: 2021-11-09 | End: 2021-11-09

## 2021-11-09 RX ORDER — GLYCOPYRROLATE 1 MG/5 ML
SYRINGE (ML) INTRAVENOUS PRN
Status: DISCONTINUED | OUTPATIENT
Start: 2021-11-09 | End: 2021-11-09 | Stop reason: SDUPTHER

## 2021-11-09 RX ORDER — FENTANYL CITRATE 50 UG/ML
INJECTION, SOLUTION INTRAMUSCULAR; INTRAVENOUS PRN
Status: DISCONTINUED | OUTPATIENT
Start: 2021-11-09 | End: 2021-11-09 | Stop reason: SDUPTHER

## 2021-11-09 RX ORDER — FENTANYL CITRATE 50 UG/ML
50 INJECTION, SOLUTION INTRAMUSCULAR; INTRAVENOUS EVERY 5 MIN PRN
Status: DISCONTINUED | OUTPATIENT
Start: 2021-11-09 | End: 2021-11-09 | Stop reason: HOSPADM

## 2021-11-09 RX ORDER — OXYCODONE HYDROCHLORIDE AND ACETAMINOPHEN 5; 325 MG/1; MG/1
1 TABLET ORAL EVERY 6 HOURS PRN
Qty: 28 TABLET | Refills: 0 | Status: SHIPPED | OUTPATIENT
Start: 2021-11-09 | End: 2021-11-16

## 2021-11-09 RX ORDER — MEPERIDINE HYDROCHLORIDE 25 MG/ML
12.5 INJECTION INTRAMUSCULAR; INTRAVENOUS; SUBCUTANEOUS
Status: DISCONTINUED | OUTPATIENT
Start: 2021-11-09 | End: 2021-11-09 | Stop reason: HOSPADM

## 2021-11-09 RX ORDER — PROMETHAZINE HYDROCHLORIDE 25 MG/ML
6.25 INJECTION, SOLUTION INTRAMUSCULAR; INTRAVENOUS PRN
Status: DISCONTINUED | OUTPATIENT
Start: 2021-11-09 | End: 2021-11-09 | Stop reason: HOSPADM

## 2021-11-09 RX ORDER — ONDANSETRON 2 MG/ML
INJECTION INTRAMUSCULAR; INTRAVENOUS PRN
Status: DISCONTINUED | OUTPATIENT
Start: 2021-11-09 | End: 2021-11-09 | Stop reason: SDUPTHER

## 2021-11-09 RX ORDER — FENTANYL CITRATE 50 UG/ML
25 INJECTION, SOLUTION INTRAMUSCULAR; INTRAVENOUS PRN
Status: DISCONTINUED | OUTPATIENT
Start: 2021-11-09 | End: 2021-11-09 | Stop reason: HOSPADM

## 2021-11-09 RX ORDER — SODIUM CHLORIDE 9 MG/ML
INJECTION, SOLUTION INTRAVENOUS CONTINUOUS
Status: DISCONTINUED | OUTPATIENT
Start: 2021-11-09 | End: 2021-11-09 | Stop reason: HOSPADM

## 2021-11-09 RX ADMIN — FENTANYL CITRATE 25 MCG: 50 INJECTION, SOLUTION INTRAMUSCULAR; INTRAVENOUS at 09:58

## 2021-11-09 RX ADMIN — SODIUM CHLORIDE: 9 INJECTION, SOLUTION INTRAVENOUS at 09:50

## 2021-11-09 RX ADMIN — ONDANSETRON 4 MG: 2 INJECTION INTRAMUSCULAR; INTRAVENOUS at 09:58

## 2021-11-09 RX ADMIN — FENTANYL CITRATE 25 MCG: 0.05 INJECTION, SOLUTION INTRAMUSCULAR; INTRAVENOUS at 08:19

## 2021-11-09 RX ADMIN — MIDAZOLAM 1 MG: 1 INJECTION INTRAMUSCULAR; INTRAVENOUS at 09:50

## 2021-11-09 RX ADMIN — PROPOFOL 220 MG: 10 INJECTION, EMULSION INTRAVENOUS at 09:58

## 2021-11-09 RX ADMIN — Medication 2000 MG: at 10:00

## 2021-11-09 RX ADMIN — OXYCODONE AND ACETAMINOPHEN 1 TABLET: 5; 325 TABLET ORAL at 10:49

## 2021-11-09 RX ADMIN — FENTANYL CITRATE 25 MCG: 50 INJECTION, SOLUTION INTRAMUSCULAR; INTRAVENOUS at 09:55

## 2021-11-09 RX ADMIN — Medication 0.2 MG: at 09:58

## 2021-11-09 ASSESSMENT — PAIN DESCRIPTION - ORIENTATION
ORIENTATION: RIGHT
ORIENTATION: RIGHT

## 2021-11-09 ASSESSMENT — LIFESTYLE VARIABLES: SMOKING_STATUS: 1

## 2021-11-09 ASSESSMENT — PAIN DESCRIPTION - DESCRIPTORS
DESCRIPTORS: ACHING
DESCRIPTORS: ACHING;BURNING

## 2021-11-09 ASSESSMENT — PULMONARY FUNCTION TESTS
PIF_VALUE: 1
PIF_VALUE: 0
PIF_VALUE: 1
PIF_VALUE: 0
PIF_VALUE: 1

## 2021-11-09 ASSESSMENT — PAIN SCALES - GENERAL
PAINLEVEL_OUTOF10: 8
PAINLEVEL_OUTOF10: 8
PAINLEVEL_OUTOF10: 10
PAINLEVEL_OUTOF10: 4

## 2021-11-09 ASSESSMENT — PAIN DESCRIPTION - PAIN TYPE
TYPE: SURGICAL PAIN
TYPE: SURGICAL PAIN

## 2021-11-09 ASSESSMENT — PAIN DESCRIPTION - LOCATION
LOCATION: WRIST
LOCATION: HAND

## 2021-11-09 ASSESSMENT — PAIN DESCRIPTION - PROGRESSION: CLINICAL_PROGRESSION: GRADUALLY IMPROVING

## 2021-11-09 NOTE — H&P
Department of Orthopedic Surgery  History and Physical        CHIEF COMPLAINT:  Right shoulder and right small finger pain     HISTORY OF PRESENT ILLNESS:                 The patient is a 45 y.o. female who presents with the above CC. Pt is RHD and does not work. In regards to her shoulder, she has had right shoulder pain located at the far end of her clavicle since a fall in September. She had [facility]Rays taken at [facility] at that time that demonstrated a possible distal clavicle fracture. She has not seen an orthopedic surgeon since the injury, but does have a CT scan under review from her insurance. She states the pain is improving since the fall but remains painful with certain activities.     In regards to her small finger, she had a fall 2 weeks ago when she hurt her right small digit, however she cannot recall exact mechanism of injury. She was seen in the ED at the time, when she was placed in a splint. She has remained in the splint since. Pain and swelling is improving but still present and she notices a deformity to the digit. Denies any new numbness or tingling. Denies other injuries     Past Medical History:    Past Medical History             Diagnosis Date    Cirrhosis ([facility] [address] 2013         Past Surgical History:    Past Surgical History             Procedure Laterality Date    HIP FRACTURE SURGERY Left 5/22/2021     LEFT HIP OPEN REDUCTION INTERNAL FIXATION [facility]       shoulder         Current Medications:   Current Hospital Medications   No current facility-administered medications for this visit. Allergies:  Patient has no known allergies.     Social History:   TOBACCO:   reports that she has been smoking. She has been smoking about 0.25 packs per day. She has never used smokeless tobacco.  ETOH:   reports current alcohol use of about 2.0 standard drinks of alcohol per week. DRUGS:   reports current drug use.  Drug: Marijuana perfused  · Sensation intact to touch in radial/ulnar/median nerve distributions to hand           DATA:    CBC:         Lab Results   Component Value Date     WBC 5.3 05/22/2021     RBC 3.73 05/22/2021     HGB 12.8 05/22/2021     HCT 39.9 05/22/2021     .0 05/22/2021     MCH 34.3 05/22/2021     MCHC 32.1 05/22/2021     RDW 14.0 05/22/2021      05/22/2021     MPV 10.2 05/22/2021      PT/INR:          Lab Results   Component Value Date     PROTIME 14.1 05/22/2021     INR 1.3 05/22/2021         Radiology Review:  XR Right finger PA, oblique and lateral taken today and reviewed with the patient as well as compared to previous right finger xray from 10/25/21 that demonstrates a comminuted proximal phalanx fracture with ulnar deviation and rotation malalignment and shortening. Impression: Right small digit proximal phalanx fracture closed displaced     XR R shoulder 3 views AP, Scapular Y and axillary views taken today and reviewed with the patient demonstrating distal clavicle fracture with abundant callous formation. Impression: Right distal clavicle with callus formation     IMPRESSION:  · Right small digit proximal phalanx fracture  · Right distal clavicle fracture  · Cirrhosis     PLAN:  In regards to the distal clavicle fracture, discussed with patient radiological findings, prognosis as well as potential complications. It is currently healing well and expected to continue as such. We will continue with nonoperative treatment. In regards to her small digit phalangeal fracture, she will need operative intervention to restore alignment and rotation. We will attempt closed reduction with percutaneous pinning with possible open reduction and internal fixation if needed. Risks and benefits were discussed with the patient she would like to proceed.     I have seen and evaluated the patient and agree with the above assessment and plan on today's visit.  I have performed the key components of the history and physical examination with significant findings of right lateral third clavicle fracture with callus formation as well as a close displaced little finger proximal phalanx fracture with rotational malalignment and shortening. Recommended to continue conservative care regards the right clavicle fracture. Activity modifications as needed. Regards to small finger recommended close possible open reduction with fixation as needed. Postoperative course and the potential healing complications were explained. All questions answered. . I concur with the findings and plan as documented.        I explained the risks, benefits, alternatives and complications of surgery with the patient including but not limited to the risks of death, possible damage to nerves, vessels, or tendons, possible infection, possible nonunion, possible malunion, finger malalignment with residual crossover or abduction deformity and stiffness, Possible hardware failure, possible need for hardware removal, stiffness, as well as the possible need further surgery and unanticipated complications. The patient voiced understanding and all questions were answered. The patient elected to proceed with surgical intervention.         History and Physical Update     Patient was seen and examined. Patient's history and physical was reviewed with the patient. There has been no significant interval changes.       Electronically signed by Briana Montero MD on 11/9/2021 at 7:31 AM

## 2021-11-09 NOTE — ANESTHESIA PRE PROCEDURE
Department of Anesthesiology  Preprocedure Note       Name:  Werner Gibson   Age:  45 y.o.  :  1970                                          MRN:  [id number]         Date:  2021      Surgeon: Veronica Guido):  Yahir Mensah MD    Procedure: Procedure(s):  RIGHT SMALL FINGER PROXIMAL PHALANX CLOSED VS OPEN REDUCTION INTERNAL FIXATION   ++SYNTHES++    Medications prior to admission:   Prior to Admission medications    Medication Sig Start Date End Date Taking?  Authorizing Provider   traZODone (DESYREL) 150 MG tablet Take 150 mg by mouth nightly   Yes Historical Provider, MD       Current medications:    Current Facility-Administered Medications   Medication Dose Route Frequency Provider Last Rate Last Admin    0.9 % sodium chloride infusion   IntraVENous Continuous REBECCA Bonilla        0.9 % sodium chloride infusion  25 mL IntraVENous PRN REBECCA Bonilla        ceFAZolin (ANCEF) 2000 mg in sterile water 20 mL IV syringe  2,000 mg IntraVENous On Call to [address]        sodium chloride flush 0.9 % injection 5-40 mL  5-40 mL IntraVENous 2 times per day Aj Bay, PA        sodium chloride flush 0.9 % injection 5-40 mL  5-40 mL IntraVENous PRN Aj Bay PA           Allergies:  No Known Allergies    Problem List:    Patient Active Problem List   Diagnosis Code    Closed left hip fracture, initial encounter ([facility] [address] S72.002A    [facility] S[facility]    Cirrhosis ([facility] [address] K74.60       Past Medical History:        Diagnosis Date    Cirrhosis ([facility] [address]        Past Surgical History:        Procedure Laterality Date    HIP FRACTURE SURGERY Left 2021    LEFT HIP OPEN REDUCTION INTERNAL FIXATION performed by Yahir Mensah MD at / [facility] [address] Left     shoulder       Social History:    Social History     Tobacco Use    Smoking status: Current Every Day Smoker     Packs/day: 0.25    Smokeless tobacco: Never Used   Substance Use Topics    Alcohol use: Not Currently     Alcohol/week: 2.0 standard drinks     Types: 2 Glasses of wine per week     Comment:  states she stopped a few days ago, prior 2-4 glasses wine/week                                Ready to quit: Not Answered  Counseling given: Not Answered      Vital Signs (Current):   Vitals:    11/08/21 1044 11/09/21 0734   Weight: 103 lb (46.7 kg) 103 lb (46.7 kg)   Height: 5' 4\" (1.626 m) 5' 4\" (1.626 m)                                              BP Readings from Last 3 Encounters:   10/26/21 (!) 142/96   05/24/21 (!) 119/57   05/22/21 (!) 113/59       NPO Status:                                                                                 BMI:   Wt Readings from Last 3 Encounters:   11/09/21 103 lb (46.7 kg)   11/08/21 103 lb (46.7 kg)   10/25/21 105 lb (47.6 kg)     Body mass index is 17.68 kg/m². CBC:   Lab Results   Component Value Date    WBC 5.3 05/22/2021    RBC 3.73 05/22/2021    HGB 12.8 05/22/2021    HCT 39.9 05/22/2021    .0 05/22/2021    RDW 14.0 05/22/2021     05/22/2021       CMP:   Lab Results   Component Value Date     05/22/2021     05/22/2021    K 3.9 05/22/2021    K 4.0 05/22/2021    CL 96 05/22/2021    CL 98 05/22/2021    CO2 25 05/22/2021    CO2 25 05/22/2021    BUN 7 05/22/2021    BUN 7 05/22/2021    CREATININE 0.4 05/22/2021    CREATININE 0.4 05/22/2021    GFRAA >60 05/22/2021    GFRAA >60 05/22/2021    LABGLOM >60 05/22/2021    LABGLOM >60 05/22/2021    GLUCOSE 73 05/22/2021    GLUCOSE 75 05/22/2021    PROT 7.2 05/22/2021    CALCIUM 9.1 05/22/2021    CALCIUM 9.1 05/22/2021    BILITOT 1.5 05/22/2021    ALKPHOS 169 05/22/2021    AST 51 05/22/2021    ALT 18 05/22/2021       POC Tests: No results for input(s): POCGLU, POCNA, POCK, POCCL, POCBUN, POCHEMO, POCHCT in the last 72 hours. Coags:   Lab Results   Component Value Date    PROTIME 14.1 05/22/2021    INR 1.3 05/22/2021       HCG (If Applicable):  No

## 2021-11-09 NOTE — ANESTHESIA POSTPROCEDURE EVALUATION
Department of Anesthesiology  Postprocedure Note    Patient: Anthony Dillon  MRN: [id number]  YOB: 1970  Date of evaluation: 11/9/2021  Time:  12:12 PM     Procedure Summary     Date: 11/09/21 Room / Location: [facility] / [address] [place] [address]    Anesthesia Start: 9532 Anesthesia Stop: 1024    Procedure: RIGHT SMALL FINGER PROXIMAL PHALANX CLOSED FIXATION (Right ) Diagnosis: (CLOSED DISPLACED FRACTURE OF PROXIMAL PHALANX RIGHT LITTLE FINGER)    Surgeons: Mimi Cm MD Responsible Provider: Sosa Bustillo MD    Anesthesia Type: MAC ASA Status: 3          Anesthesia Type: MAC    Carlin Phase I: Carlin Score: 10    Carlin Phase II: Carlin Score: 10    Last vitals: Reviewed and per EMR flowsheets.        Anesthesia Post Evaluation    Patient location during evaluation: PACU  Patient participation: complete - patient participated  Level of consciousness: awake and alert  Airway patency: patent  Nausea & Vomiting: no vomiting and no nausea  Complications: no  Cardiovascular status: blood pressure returned to baseline  Respiratory status: acceptable  Hydration status: euvolemic

## 2021-11-10 LAB — MRSA CULTURE ONLY: NORMAL

## 2021-11-10 NOTE — OP NOTE
[address] [address]                                OPERATIVE REPORT    PATIENT NAME: Doreen Remy                   :        1970  MED REC NO:   [id number]                            ROOM:  ACCOUNT NO:   [de-identified]                           ADMIT DATE: 2021  PROVIDER:     Duglas Guadalupe MD    DATE OF PROCEDURE:  2021    PREOPERATIVE DIAGNOSIS:  Right closed displaced small finger proximal  phalanx fracture. POSTOPERATIVE DIAGNOSIS:  Right closed displaced small finger proximal  phalanx fracture. PROCEDURE PERFORMED:  Right small finger proximal phalanx fracture open  reduction and internal fixation with intramedullary screw fixation. ANESTHESIA:  1. Monitored anesthesia care. 2.  Local anesthetic by surgeon consisting of approximately 10 mL of 1%  lidocaine plain. SURGEON:  Duglas Guadalupe MD.    ASSISTANT:  Jason Hays DO, orthopedic surgery resident. TOTAL TOURNIQUET TIME:  Approximately 10 minutes at 250 mmHg of brachial  tourniquet. ESTIMATED BLOOD LOSS:  Minimal.    FINDINGS:  1. Intraoperative fluoroscopy was used to confirm comminuted, angulated  small finger proximal phalanx fracture. 2.  Status post reduction and intramedullary screw fixation, improved  alignment was restored to the small finger with full passive flexion and  extension with appropriate nathan and alignment. 3.  Intraoperative fluoroscopy was used to confirm subchondral placement  of the intramedullary screw. COMPLICATIONS:  None. DISPOSITION:  The patient remained stable throughout the procedure. OPERATIVE INDICATIONS:  The patient is a 58-year-old female who  sustained a small finger proximal phalanx fracture. She was seen in the  office and found to have gross angulation, stiffness, and loss of range  of motion.   Risks, benefits, alternatives, and complications of both  surgical and nonsurgical treatment options were explained to her  including, but not limited to the risk of infection, damage to nerves,  vessels, or tendons, malunion, nonunion, symptomatic hardware, need for  hardware removal, stiffness, loss of range of motion, need for therapy,  additional surgery, and unforeseen complications. She understood and  wished to proceed. DESCRIPTION OF PROCEDURE:  The patient was identified in the holding  area. The right small finger was identified as the surgical site. She  was then seen by Anesthesia, taken to the operating room, placed supine  on the table, and underwent monitored anesthesia care per Anesthesia  Department. All bony prominences were well padded. A well-padded arm  tourniquet was placed. Under sterile conditions, a digital block was  placed to the small finger with 10 mL of 1% lidocaine plain _____. The  right upper extremity was prepared and draped in the standard sterile  fashion. Arm was exsanguinated. Tourniquet was inflated to 250 mmHg. Total tourniquet time was 10 minutes. Fluoroscopy was then brought in. AP and lateral views revealed a  spiral-oblique comminuted fracture of the smaller finger with external  rotation deformity as well as shortening. Longitudinal traction and  reduction maneuvers were performed to reduce the fracture into  appropriate alignment. This improved the overall alignment. Internal  fixation was then undertaken. An incision over the dorsal MCP joint was then created and with a palmar  to dorsal maneuver, a guide pin for a Synthes CCHS 2.5 mm diameter screw  was then placed down the [id number] access of the finger with  longitudinal traction and reduction maneuvers performed maintaining the  reduction. The pin was placed [id number] down the shaft of the  proximal phalanx. This was confirmed in both AP and lateral planes to  have excellent alignment. The length of the screw was estimated about  24 mm.   The wire was then drilled across the PIP joint followed by  drilling under fluoroscopic imaging while protecting the metacarpal head  and using oscillating mode of the drill followed by insertion of the 24  mm length [employer] Synthes 2.5 mm diameter screw, which provided excellent  fixation to the fracture. Final tightening was undertaken under  fluoroscopic imaging with reduction maneuvers maintained securing the  fracture in proper alignment. Guidewire was then backed out with a  direct guidewire . AP, lateral, and obliques confirmed excellent  alignment with intramedullary screw fixation as well as subchondral  placement of the screw proximally as well as extraarticular placement  distally. Gentle stress maneuvers revealed the fracture to be very  stable without any residual instability or shortening. Therefore, the  wound was copiously irrigated out. The tourniquet was deflated. Total  tourniquet time was 10 minutes. Hemostasis was achieved with bipolar  cautery. Skin closed with nylon suture and a soft sterile dressing was  applied.         Howard Cadena MD    D: 11/09/2021 16:58:12       T: 11/10/2021 1:33:00     [email or web address]  Job#: [id number]     Doc#: [id number]    CC:

## 2021-11-18 ENCOUNTER — OFFICE VISIT (OUTPATIENT)
Dept: ORTHOPEDIC SURGERY | Age: 51
End: 2021-11-18

## 2021-11-18 VITALS — RESPIRATION RATE: 20 BRPM | BODY MASS INDEX: 17.58 KG/M2 | HEIGHT: 64 IN | WEIGHT: 103 LBS

## 2021-11-18 DIAGNOSIS — G89.18 POST-OPERATIVE PAIN: ICD-10-CM

## 2021-11-18 DIAGNOSIS — S62.616A CLOSED DISPLACED FRACTURE OF PROXIMAL PHALANX OF RIGHT LITTLE FINGER, INITIAL ENCOUNTER: ICD-10-CM

## 2021-11-18 DIAGNOSIS — S42.031A DISPLACED FRACTURE OF LATERAL END OF RIGHT CLAVICLE, INITIAL ENCOUNTER FOR CLOSED FRACTURE: Primary | ICD-10-CM

## 2021-11-18 PROCEDURE — 99024 POSTOP FOLLOW-UP VISIT: CPT | Performed by: ORTHOPAEDIC SURGERY

## 2021-11-18 RX ORDER — OXYCODONE HYDROCHLORIDE AND ACETAMINOPHEN 5; 325 MG/1; MG/1
1 TABLET ORAL EVERY 6 HOURS PRN
Qty: 28 TABLET | Refills: 0 | Status: CANCELLED | OUTPATIENT
Start: 2021-11-18 | End: 2021-11-25

## 2021-11-18 NOTE — PATIENT INSTRUCTIONS
Patient Education        Finger Fracture: Rehab Exercises  Your Care Instructions  Here are some examples of typical rehabilitation exercises for your condition. Start each exercise slowly. Ease off the exercise if you start to have pain. Your doctor or your physical or occupational therapist will tell you when you can start these exercises and which ones will work best for you. How to do the exercises  Finger extension    1. Place your hand flat on a table, palm down. 2. Lift and then lower your affected finger off the table. 3. Repeat 8 to 12 times. MP extension    1. Place your good hand on a table, palm up. Put your hand with the affected finger on top of your good hand with your fingers wrapped around the thumb of your good hand like you are making a fist.  2. Slowly uncurl the joints of your hand with the affected finger where your fingers connect to your hand so that only the top two joints of your fingers are bent. Your fingers will look like a hook. 3. Move back to your starting position, with your fingers wrapped around your good thumb. 4. Repeat 8 to 12 times. DIP flexion    1. With your good hand, grasp your affected finger. Your thumb will be on the top side of your finger just below the joint that is closest to your fingernail. 2. Slowly bend your affected finger only at the joint closest to your fingernail. Hold for about 6 seconds. 3. Repeat 8 to 12 times. PIP extension (with MP extension)    1. Place your good hand on a table, palm up. Put your hand with the affected finger on top of your good hand. 2. Use the thumb and fingers of your good hand to grasp below the middle joint of your affected finger. 3. Bend and then straighten the last two joints of your affected finger. 4. Repeat 8 to 12 times. Isolated PIP flexion    1. Place the hand with the affected finger flat on a table, palm up. With your other hand, press down on the fingers that are not affected.  Your affected finger will be free to move. 2. Slowly bend your affected finger. Hold for about 6 seconds. Then straighten your finger. 3. Repeat 8 to 12 times. Imaginary ball squeeze    1. Pretend to hold an imaginary ball. 2. Slowly bend your fingers around the imaginary ball, and squeeze the \"ball\" for about 6 seconds. Then slowly straighten your fingers to release the \"ball. \"  3. Repeat 8 to 12 times. Tendon glides    1. In this exercise, the steps follow one another to a make a continuous movement. 2. Hold your hand upward. Your fingers and thumb will be pointing straight up. Your wrist should be relaxed, following the line of your fingers and thumb. 3. Curl your fingers so that the top two joints in them are bent, and your fingers wrap down. Your fingertips should touch or be near the base of your fingers. Your fingers will look like a hook. 4. Make a fist by bending your knuckles. Your thumb can gently rest against your index (pointing) finger. 5. Unwind your fingers slightly so that your fingertips can touch the base of your palm. Your thumb can rest against your index finger. 6. Move back to your starting position, with your fingers and thumb pointing up. 7. Repeat the series of motions 8 to 12 times. Towel squeeze    1. Place a small towel roll on a table. 2. With your palm facing down, grab the towel and squeeze it for about 6 seconds. Then slowly straighten your fingers to release the towel. 3. Repeat 8 to 12 times. Towel grab    1. Fold a small towel in half, and lay it flat on a table. 2. Put your hand flat on the towel, palm down. Grab the towel, and scrunch it toward you until your hand is in a fist.  3. Slowly straighten your fingers to push the towel back so it is flat on the table again. 4. Repeat 8 to 12 times. Follow-up care is a key part of your treatment and safety. Be sure to make and go to all appointments, and call your doctor if you are having problems.  It's also a good idea to know your test results and keep a list of the medicines you take. Where can you learn more? Go to [email or web address] and sign in to your [employer] account. Enter V403 in the [facility] box to learn more about \"Finger Fracture: Rehab Exercises. \"     If you do not have an account, please click on the \"Sign Up Now\" link. Current as of: July 1, 2021               Content Version: 13.0  © 2006-2021 Healthwise, Incorporated. Care instructions adapted under license by [facility] ([facility]). If you have questions about a medical condition or this instruction, always ask your healthcare professional. [address] any warranty or liability for your use of this information.

## 2021-11-18 NOTE — PROGRESS NOTES
HPI: Patient presents today postop day #8 status post right small finger proximal phalanx ORIF. She is also nonop management for a clavicle fracture. She reports pain. Physical Exam: Incision clean dry intact with sutures in place. No erythema or signs of infection. Stiffness with flexion extension of the small finger. Positive swelling to the small finger. Median, ulnar, radial nerves intact light touch. Brisk capillary refill. Positive tenderness over the distal clavicle. Xray: x-rays of the right small finger were obtained today in the office and reviewed with the patient. 3 views: AP, lateral, oblique: demonstrate stable fixation of right small finger proximal phalanx fracture when compared to intraoperative fluoroscopy. No changes in hardware. Impression: Stable right small finger proximal phalanx ORIF    Xray: x-rays of the right clavicle were obtained today in the office and reviewed with the patient. 2 views: AP, lateral: demonstrate stable right distal one third clavicle with interval callus formation. Impression: Healing distal one third clavicle fracture    Assessment: postop day #8 status post right small finger proximal phalanx ORIF  Healing distal one third clavicle fracture    Plan:   Suture removed today. Scar management advised. Recommended emmanuel taping the ring and small fingers together. Activity restrictions reviewed with patient. Patient referred to therapy for range of motion exercises. Home exercise program also provide the patient. Follow up in 4 weeks with repeat x-rays of the finger. All questions and concerns answered. I have seen and evaluated the patient and agree with the above assessment and plan on today's visit. I have performed the key components of the history and physical examination with significant findings of postop small finger ORIF with significant stiffness of the small finger.   Patient referred to therapy for active and passive range of motion modalities as needed. May advance in therapy as tolerated. She also has a right clavicle fracture which we are treating conservatively. This is healing nicely on x-ray. . I concur with the findings and plan as documented.     Cecily Amos MD  11/18/2021

## 2021-12-01 ENCOUNTER — EVALUATION (OUTPATIENT)
Dept: OCCUPATIONAL THERAPY | Age: 51
End: 2021-12-01
Payer: COMMERCIAL

## 2021-12-01 DIAGNOSIS — S62.616A CLOSED DISPLACED FRACTURE OF PROXIMAL PHALANX OF RIGHT LITTLE FINGER, INITIAL ENCOUNTER: Primary | ICD-10-CM

## 2021-12-01 PROCEDURE — 97165 OT EVAL LOW COMPLEX 30 MIN: CPT | Performed by: OCCUPATIONAL THERAPIST

## 2021-12-01 PROCEDURE — 97140 MANUAL THERAPY 1/> REGIONS: CPT | Performed by: OCCUPATIONAL THERAPIST

## 2021-12-01 PROCEDURE — 97110 THERAPEUTIC EXERCISES: CPT | Performed by: OCCUPATIONAL THERAPIST

## 2021-12-01 NOTE — PROGRESS NOTES
[facility] OT  [address]  [address] [address]  Dept: Mickie Alvarez [address] [facility] OT Fax: [phone]    Date:  2021  Initial Evaluation Date: 2021   Evaluating Therapist: Helen Gilbert OT    Patient Name:  Westley Douglas    :  1970    Restrictions/Precautions: Follow ORIF proximal phalanx fracture protocol, low fall risk  Diagnosis: S62.616A (ICD-10-CM) - Closed displaced fracture of proximal phalanx of right little finger, initial encounter       Date of Surgery/Injury:     Insurance/Certification information: [address] of care signed (Y/N): N  Visit# / total visits: -    Referring Practitioner:  Dr. Shanita Rushing  Specific Practitioner Orders: Right hand/digits ROM as tolerated, modalities prn    Assessment of current deficits   [] Functional mobility   [x] ADLs  [x] Strength   [] Cognition   [] Functional transfers   [x] IADLs  [x] Safety Awareness  [x] Endurance   [x] Fine Motor Coordination  [] Balance  [] Vision/perception  [] Sensation    [x] Gross Motor Coordination [x] ROM  [x] Pain   [x] Edema    [x] Scar Adhesion/Skin Integrity     OT PLAN OF CARE   OT POC based on physician orders, patient diagnosis and results of clinical assessment    Frequency/Duration: 1-2x/week for 12-14 visits. Frequency/Duration 1-2x / week for 12-14 visits.    Certification period From: 2021  To: 2021    Specific OT Treatment to include:     [x] Instruction in HEP                   Modalities:  [x] Therapeutic Exercise        [x] Ultrasound               [x] Electrical Stimulation/Attended  [x] PROM/Stretching                    [x] Fluidotherapy          [x]  Paraffin                   [x] AAROM  [x] AROM                 [x] Iontophoresis:   [x] Tendon Glides                                               [] Neuromuscular Re-Ed            [x] ADL/IADL re-training    [x] Therapeutic Activity [x] Pain Management with/without modalities PRN                 [x] Manual Therapy                      [x] Splinting                      [x] Scar Management                   []Joint Protection/Training  []Ergonomics                             [x] Joint Mobilization        [] Adaptive Equipment Assessment/Training                             [x] Manual Edema Mobilization   [x] Myofascial Release                 [] Energy Conservation/Work Simplification  [x] GM/FM Coordination       [] Safety retraining/education per  individual diagnosis/goals  [x] Desensitization       Patient Specific Goal: Patient would like improved motion of the ROM                             GOALS (Long term same as Short term):  1) Patient will demonstrate good understanding of home program (exercises/activities/diagnosis/prognosis/goals) with good accuracy. 2) Patient will demonstrate increased active/passive range of motion of their RUE to [facility] for ADL/IADL completion. 3) Patient will demonstrate increased /pinch strength of at least 10 / 5 pinch pounds of their R hand. 4) Patient to report decreased pain in their affected R upper extremity from 6/10 to 2/10 or less with resistive functional use. 5) Patient to report 100% compliance with their splint wear, care, and precautions if needed. 6) Patient will be knowledgeable of edema control techniques as evident with decreases from moderate to mild/none. 7) Patient will demonstrate a non-tender/non-adherent scar. 8) Patient will report ADL functions as Mod I/I using RUE. 9) Patient will demonstrate improved functional activity tolerance from fair -  to good + for ADL/IADL completion. 10) Patient will decrease QuickDASH score to 30% or less for increased participation in daily functional activities.        Past Medical History:   Past Medical History:   Diagnosis Date    Cirrhosis ([facility] [address] 2013     Past Surgical History:   Past Surgical History:   Procedure Laterality Date    FINGER SURGERY Right 11/9/2021    RIGHT SMALL FINGER PROXIMAL PHALANX CLOSED FIXATION performed by Nohemi Salvador MD at [address] [facility] Left 5/22/2021    LEFT HIP OPEN REDUCTION INTERNAL FIXATION performed by Nohemi Salvdaor MD at [address] [facility] Left     shoulder       Reason for Referral: Pt is a 45 y.o. female who suffered a R small finger proximal phalanx fracture. Pt underwent a ORIF with intramedullary screw fixation by Dr. Dahlia San on 11/09/2021. Pt was referred to outpatient Occupational therapy for improvement of ROM. Home Living: Lives at home with   Prior Level of Function: Independent    Cognition:   Alert/Oriented x3     IADL STATUS:   Ind Mod I Min A Mod A Max A Dep Other   Homemaking Responsibility   x       Shopping Responsibility:   x       Mode of Transportation: x         Leisure & Hobbies:       x   Work:       x     Comments: Difficulty with lifting objects and  assists with carrying laundry basket and groceries. ADL STATUS:   Ind Mod I Min A Mod A Max A Dep Other   Feeding:   x       Grooming:   x       Bathing: x         UE Dressing: x         LE Dressing: x         Toileting: x         Transfers: x           Comments: Difficulty cutting food, brushing hair. Unable to open jars and containers, difficulty with writing, difficulty typing. Pain Level: 6 on scale of 1-10 at the base of the 5th digit, throbbing, aching. UE Assessment:  Pt presented to evaluation with no splints/braces present. Pt reports difficulty getting comfortable while sleeping and slight tingling in the 5th digit. Pt demonstrates difficulty with opposition to the 5th digit and unable to make a full fist due to difficulty with flexion of the 5th digit. Incision site is dry and healed with moderate edema present at site and throughout 5th digit. Pt reports soreness in 4th digit however full ROM noted.  Pt noted to have mild amount of scar tissue around incision site. Pt demonstrated limited ROM of the R 5th digit with extension lag and limited flexion, moderate edema of the 5th digit, pain which makes it difficult to sleep, over compensation of the MCP joint throughout exercises, mild scar tissue around the incision site and inability to functionally use RUE without difficulty. Pt would benefit from skilled OT services to improve functional use of RUE by increasing AROM of the 5th digit, decreasing pain/edema throughout RUE, increasing strength/endurance, decreasing scar tissue and increasing functional use of the RUE to complete ADL/IADLtasks. Pt would benefit from skilled OT to assist with following appropriate hand therapy protocol safely to maximize rehabilitation potential of the RUE. Comment: Hand Dominance is R    R  Hand ROM  Edema AROM [x]         PROM[] IE        5 th Digit MCP Extension/Flexion   0-45 H /* -6*/54*      6.8 cm  L: 5.5 cm PIP Extension/Flexion   0*/100* -24*/46*      5.5 cm  L: 4.6 cm DIP Extension/Flexion   0*/70-90* 12*            Edema Description/Circumferential Measurements:   Moderate edema throughout 5th digit into the hand. Dynamometer (setting 2): TBA per protocol    QuickDASH Score: 52.3% disability reported     Intervention: Issued XS extension tube for wear at night only to assist with extension lag of the 5th digit. Pt educated on modalities for pain/edema management as well as retrograde massage which was demonstrated. Issued compression sleeves to wear throughout the day to assist with edema management. Issued and completed x10 of each exercise with therapist education on proper completion: MCP, PIP and DIP joint blocking ( avoid compensatio of the MCP), Hook/fist- gentle and easy, Finger abduction/adduction and finger opposition. Pt is to complete 10-15x 3x/day. Pt tolerated all exercises well with fair carry over noted.       Eval Complexity: low  Profile and History- Chart reviewed  Assessment of Occupational Performance and Identification of Deficits- 11   Clinical Decision Making- no additional modifications required    Rehab Potential:                                 [x] Good  [] Fair  [] Poor        Suggested Professional Referral:       [x] No  [] Yes:  Barriers to Goal Achievement[de-identified]          [x] No  [] Yes:  Domestic Concerns:                           [x] No  [] Yes:       Patient. Education:  [x] Plans/Goals, Risks/Benefits discussed  [x] Home exercise program  Method of Education: [x] Verbal  [x] Demo  [x] Written  Comprehension of Education:  [x] Verbalizes understanding. [x] Demonstrates understanding. [x] Needs Review. [x] Demonstrates/verbalizes understanding of HEP/Ed previously given. Patient understands diagnosis/prognosis and consents to treatment, plan and goals: [x] Yes    [] No     Goal Formulation: Patient  Time In: 2:00 p            Time Out: 3:00 p                      Timed Code Treatment Minutes: 40 minutes      CODE  Minutes  Units   48741 OT Eval Low 20 1   45036 OT Eval Medium     77692 OT Eval High     32984 Fluidotherapy     22604 Manual 10 1   18939 Therapeutic Ex 30 2   91939 Therapeutic Activity     56024 ADL/COMP Tech Train     03792 Neuromuscular Re-Ed     81938 OrthoManagementTraining     66298 Paraffin     48845 Electrical Stim - Attended     [id number] Iontophoresis     12966 Ultrasound      Other       60 4        Electronically signed by: Parris Irvin OT R/L, Soumy[address] #[id number]       [id number] Certification / Comments      Frequency/Duration 1-2x / week for 12-14 visits. Certification period From: 12/01/2021  To: 03/01/2021     I have reviewed the Plan of Care established for skilled therapy services and certify that the services are required and that they will be provided while the patient is under my care.      Physician's Comments/Revisions:                   Physicians's Printed Name: Anatoly Harman                                Physician's Signature: Date:      Please review Patient's OT evaluation and if you agree sign/date and fax back to us at our [address] [facility] OT Fax: [phone].  Thank you for your referral!

## 2021-12-07 ENCOUNTER — TREATMENT (OUTPATIENT)
Dept: OCCUPATIONAL THERAPY | Age: 51
End: 2021-12-07
Payer: COMMERCIAL

## 2021-12-07 DIAGNOSIS — S62.616A CLOSED DISPLACED FRACTURE OF PROXIMAL PHALANX OF RIGHT LITTLE FINGER, INITIAL ENCOUNTER: Primary | ICD-10-CM

## 2021-12-07 PROCEDURE — 97110 THERAPEUTIC EXERCISES: CPT | Performed by: OCCUPATIONAL THERAPIST

## 2021-12-07 PROCEDURE — 97018 PARAFFIN BATH THERAPY: CPT | Performed by: OCCUPATIONAL THERAPIST

## 2021-12-07 NOTE — PROGRESS NOTES
OCCUPATIONAL THERAPY PROGRESS NOTE    Date:  2021  Initial Evaluation Date: 2021    Patient Name:  Babatunde Agarwal    :  1970      Restrictions/Precautions: Follow ORIF proximal phalanx fracture protocol, low fall risk  Diagnosis: S62.616A (ICD-10-CM) - Closed displaced fracture of proximal phalanx of right little finger, initial encounter                                                         Date of Surgery/Injury: 2021  ( 4 weeks post op)     Insurance/Certification information: [address] of care signed (Y/N): N  Visit# / total visits:  approved from 2021 - 3/1/2022     Referring Practitioner:  Dr. Noah Oneill  Specific Practitioner Orders: Right hand/digits ROM as tolerated, modalities prn     Assessment of current deficits   []? Functional mobility                         [x]? ADLs          [x]? Strength                  []? Cognition   []? Functional transfers           [x]? IADLs         [x]? Safety Awareness  [x]? Endurance   [x]? Fine Motor Coordination    []? Balance      []? Vision/perception   []? Sensation     [x]? Gross Motor Coordination [x]? ROM           [x]? Pain                        [x]? Edema          [x]? Scar Adhesion/Skin Integrity      OT PLAN OF CARE   OT POC based on physician orders, patient diagnosis and results of clinical assessment     Frequency/Duration: 1-2x/week for 12-14 visits. Frequency/Duration 1-2x / week for 36-26 GCYRML.   Certification period From: 2021  To: 2021     Specific OT Treatment to include:      [x]? Instruction in HEP                   Modalities:  [x]?  Therapeutic Exercise                 [x]? Ultrasound               [x]? Electrical Stimulation/Attended  [x]? PROM/Stretching                    [x]? Fluidotherapy          [x]?   Paraffin                   [x]? AAROM  [x]?  AROM                 [x]? Iontophoresis:   [x]? Gerald Kapoor                                       []? Neuromuscular Re-Ed   [x]? ADL/IADL re-training    [x]?  Therapeutic Activity                  [x]? Pain Management with/without modalities PRN                 [x]?  Manual Therapy                      [x]? Splinting                                   [x]? Scar Management                   []?Joint Protection/Training  []? Ergonomics                             [x]?  Joint Mobilization                      []? Adaptive Equipment Assessment/Training                             [x]?  Manual Edema Mobilization   [x]?  Myofascial Release                 []? Energy Conservation/Work Simplification  [x]? GM/FM Coordination                []? Safety retraining/education per  individual diagnosis/goals  [x]? Desensitization        Patient Specific Goal: Patient would like improved motion of the ROM                             GOALS (Long term same as Short term):  1) Patient will demonstrate good understanding of home program (exercises/activities/diagnosis/prognosis/goals) with good accuracy. 2) Patient will demonstrate increased active/passive range of motion of their RUE to [facility] for ADL/IADL completion. 3) Patient will demonstrate increased /pinch strength of at least 10 / 5 pinch pounds of their R hand. 4) Patient to report decreased pain in their affected R upper extremity from 6/10 to 2/10 or less with resistive functional use. 5) Patient to report 100% compliance with their splint wear, care, and precautions if needed. 6) Patient will be knowledgeable of edema control techniques as evident with decreases from moderate to mild/none. 7) Patient will demonstrate a non-tender/non-adherent scar. 8) Patient will report ADL functions as Mod I/I using RUE. 9) Patient will demonstrate improved functional activity tolerance from fair -  to good + for ADL/IADL completion. 10) Patient will decrease QuickDASH score to 30% or less for increased participation in daily functional activities. Pain Level: pain 6 - 6.5/ 10 all the time. Time Out: 2:50 pm              Visit #: 2    Treatment Charges: Mins Units   Modalities=paraffin 10 1   Ther Exercise 35 2   Manual Therapy 5 0   Thera Activities     ADL/Home Mgt      Neuro Re-education     Gait Training     Group Therapy     Non-Billable Service Time     Other     Total Time/Units 50 3       -Response to Treatment: Pt pleased with her progress and glad her pain was a little less. Goals: Goals for pt can be see on initial eval occurring on 12/1/2021    Plan:   [x]  Continues Plan of care: Treatment covered based on POC and graduated to patient's progress. Pt education continues at each visit to obtain maximum benefits from skilled OT intervention.   []  Alter Plan of care:   []  Discharge:      Cornelio Styles, OT/L, CHT  OT 82

## 2021-12-09 ENCOUNTER — TREATMENT (OUTPATIENT)
Dept: OCCUPATIONAL THERAPY | Age: 51
End: 2021-12-09
Payer: COMMERCIAL

## 2021-12-09 DIAGNOSIS — S62.616A CLOSED DISPLACED FRACTURE OF PROXIMAL PHALANX OF RIGHT LITTLE FINGER, INITIAL ENCOUNTER: Primary | ICD-10-CM

## 2021-12-09 PROCEDURE — 97110 THERAPEUTIC EXERCISES: CPT | Performed by: OCCUPATIONAL THERAPIST

## 2021-12-09 PROCEDURE — 97018 PARAFFIN BATH THERAPY: CPT | Performed by: OCCUPATIONAL THERAPIST

## 2021-12-09 PROCEDURE — 97140 MANUAL THERAPY 1/> REGIONS: CPT | Performed by: OCCUPATIONAL THERAPIST

## 2021-12-09 NOTE — PROGRESS NOTES
OCCUPATIONAL THERAPY PROGRESS NOTE    Date:  2021  Initial Evaluation Date: 2021    Patient Name:  Chandrika Solorzano    :  1970      Restrictions/Precautions: Follow ORIF proximal phalanx fracture protocol, low fall risk  Diagnosis: S62.616A (ICD-10-CM) - Closed displaced fracture of proximal phalanx of right little finger, initial encounter                                                         Date of Surgery/Injury: 2021  ( 4 weeks post op)     Insurance/Certification information: [address] of care signed (Y/N): N  Visit# / total visits: 3 / 12 approved from 2021 - 3/1/2022     Referring Practitioner:  Dr. Micah Albright  Specific Practitioner Orders: Right hand/digits ROM as tolerated, modalities prn     Assessment of current deficits   []? Functional mobility                         [x]? ADLs          [x]? Strength                  []? Cognition   []? Functional transfers           [x]? IADLs         [x]? Safety Awareness  [x]? Endurance   [x]? Fine Motor Coordination    []? Balance      []? Vision/perception   []? Sensation     [x]? Gross Motor Coordination [x]? ROM           [x]? Pain                        [x]? Edema          [x]? Scar Adhesion/Skin Integrity      OT PLAN OF CARE   OT POC based on physician orders, patient diagnosis and results of clinical assessment     Frequency/Duration: 1-2x/week for 12-14 visits. Frequency/Duration 1-2x / week for 83-48 NYEDOF.   Certification period From: 2021  To: 2021     Specific OT Treatment to include:      [x]? Instruction in HEP                   Modalities:  [x]?  Therapeutic Exercise                 [x]? Ultrasound               [x]? Electrical Stimulation/Attended  [x]? PROM/Stretching                    [x]? Fluidotherapy          [x]?   Paraffin                   [x]? AAROM  [x]?  AROM                 [x]? Iontophoresis:   [x]? Rhodia Jo Ann                                       []? Neuromuscular Re-Ed   [x]? ADL/IADL re-training    [x]?  Therapeutic Activity                  [x]? Pain Management with/without modalities PRN                 [x]?  Manual Therapy                      [x]? Splinting                                   [x]? Scar Management                   []?Joint Protection/Training  []? Ergonomics                             [x]?  Joint Mobilization                      []? Adaptive Equipment Assessment/Training                             [x]?  Manual Edema Mobilization   [x]?  Myofascial Release                 []? Energy Conservation/Work Simplification  [x]? GM/FM Coordination                []? Safety retraining/education per  individual diagnosis/goals  [x]? Desensitization        Patient Specific Goal: Patient would like improved motion of the ROM                             GOALS (Long term same as Short term):  1) Patient will demonstrate good understanding of home program (exercises/activities/diagnosis/prognosis/goals) with good accuracy. 2) Patient will demonstrate increased active/passive range of motion of their RUE to [facility] for ADL/IADL completion. 3) Patient will demonstrate increased /pinch strength of at least 10 / 5 pinch pounds of their R hand. 4) Patient to report decreased pain in their affected R upper extremity from 6/10 to 2/10 or less with resistive functional use. 5) Patient to report 100% compliance with their splint wear, care, and precautions if needed. 6) Patient will be knowledgeable of edema control techniques as evident with decreases from moderate to mild/none. 7) Patient will demonstrate a non-tender/non-adherent scar. 8) Patient will report ADL functions as Mod I/I using RUE. 9) Patient will demonstrate improved functional activity tolerance from fair -  to good + for ADL/IADL completion. 10) Patient will decrease QuickDASH score to 30% or less for increased participation in daily functional activities.     Pain Level: 5/10 in the pinky, soreness/aching    Subjective: Pt states she is able to start doing laundry. Objective:  Updated POC to be completed by 1/1/2022. INTERVENTION: COMPLETED: SPECIFICS/COMMENTS:   Modality:     Paraffin dip  x For soft tissue warm up - therapist did gentle joint mob while on. AROM:     R  digits x Opposition to the 5th digit with marbles  - 5th digit PIP flexion to  pom poms   RUE  x Jt blocking: MCP, PIP and DIP x15    AAROM:               PROM/Stretching:     jt mob  x Gentle grade 1 to SF PIP and DIP jt. Before doing AROM         Scar Mass/Edema Control:     Scar and retrograde massage X  x -R SF  - fitted with sm finger sleeve ( stretched first ) to wear as needed during the day          Strengthening:               Other:     HEP X  reviewed all and given handout. Pt NOT to do finger passive ext ( too much stress). Cont with ext tube at night. Assessment/Comments: Pt is making Good progress toward stated plan of care. Pt tolerated session well with decreased pain noted. Pt continues to demonstrate difficulty with PIP flexion. Massage completed on/off throughout session and pt reports getting a paraffin machine at home to assist with pain management as well as loosening up the digit. Continue to focus on AROM as tolerated. -Rehab Potential: Good  -Requires OT Follow Up: Yes  Time In: 2:00 pm             Time Out: 3:00 pm              Visit #: 3    Treatment Charges: Mins Units   Modalities=paraffin 15 1   Ther Exercise 25 2   Manual Therapy 20 1   Thera Activities     ADL/Home Mgt      Neuro Re-education     Gait Training     Group Therapy     Non-Billable Service Time     Other     Total Time/Units 60 4       -Response to Treatment: Pt pleased with her progress and glad her pain was a little less. Goals: Goals for pt can be see on initial eval occurring on 12/1/2021    Plan:   [x]  Continues Plan of care: Treatment covered based on POC and graduated to patient's progress. Pt education continues at each visit to obtain maximum benefits from skilled OT intervention.   []  Alter Plan of care:   []  Discharge:      Lynne Corley/KALEY, Soumya Tobin [phone] #[phone]

## 2021-12-14 ENCOUNTER — TREATMENT (OUTPATIENT)
Dept: OCCUPATIONAL THERAPY | Age: 51
End: 2021-12-14
Payer: COMMERCIAL

## 2021-12-14 DIAGNOSIS — S62.616A CLOSED DISPLACED FRACTURE OF PROXIMAL PHALANX OF RIGHT LITTLE FINGER, INITIAL ENCOUNTER: Primary | ICD-10-CM

## 2021-12-14 PROCEDURE — 97140 MANUAL THERAPY 1/> REGIONS: CPT | Performed by: OCCUPATIONAL THERAPIST

## 2021-12-14 PROCEDURE — 97018 PARAFFIN BATH THERAPY: CPT | Performed by: OCCUPATIONAL THERAPIST

## 2021-12-14 PROCEDURE — 97110 THERAPEUTIC EXERCISES: CPT | Performed by: OCCUPATIONAL THERAPIST

## 2021-12-14 NOTE — PROGRESS NOTES
OCCUPATIONAL THERAPY PROGRESS NOTE    Date:  2021  Initial Evaluation Date: 2021    Patient Name:  Macario Hoffman    :  1970      Restrictions/Precautions: Follow ORIF proximal phalanx fracture protocol, low fall risk  Diagnosis: S62.616A (ICD-10-CM) - Closed displaced fracture of proximal phalanx of right little finger, initial encounter                                                         Date of Surgery/Injury: 2021  ( 4 weeks post op)     Insurance/Certification information: [address] of care signed (Y/N): N  Visit# / total visits:  approved from 2021 - 3/1/2022     Referring Practitioner:  Dr. Ericka Beltrán  Specific Practitioner Orders: Right hand/digits ROM as tolerated, modalities prn     Assessment of current deficits   []? Functional mobility                         [x]? ADLs          [x]? Strength                  []? Cognition   []? Functional transfers           [x]? IADLs         [x]? Safety Awareness  [x]? Endurance   [x]? Fine Motor Coordination    []? Balance      []? Vision/perception   []? Sensation     [x]? Gross Motor Coordination [x]? ROM           [x]? Pain                        [x]? Edema          [x]? Scar Adhesion/Skin Integrity      OT PLAN OF CARE   OT POC based on physician orders, patient diagnosis and results of clinical assessment     Frequency/Duration: 1-2x/week for 12-14 visits. Frequency/Duration 1-2x / week for 23-24 UDZN.   Certification period From: 2021  To: 2021     Specific OT Treatment to include:      [x]? Instruction in HEP                   Modalities:  [x]?  Therapeutic Exercise                 [x]? Ultrasound               [x]? Electrical Stimulation/Attended  [x]? PROM/Stretching                    [x]? Fluidotherapy          [x]?   Paraffin                   [x]? AAROM  [x]?  AROM                 [x]? Iontophoresis:   [x]? Lakeisha Neil                                       []? Neuromuscular Re-Ed   [x]? ADL/IADL re-training    [x]?  Therapeutic Activity                  [x]? Pain Management with/without modalities PRN                 [x]?  Manual Therapy                      [x]? Splinting                                   [x]? Scar Management                   []?Joint Protection/Training  []? Ergonomics                             [x]?  Joint Mobilization                      []? Adaptive Equipment Assessment/Training                             [x]?  Manual Edema Mobilization   [x]?  Myofascial Release                 []? Energy Conservation/Work Simplification  [x]? GM/FM Coordination                []? Safety retraining/education per  individual diagnosis/goals  [x]? Desensitization        Patient Specific Goal: Patient would like improved motion of the ROM                             GOALS (Long term same as Short term):  1) Patient will demonstrate good understanding of home program (exercises/activities/diagnosis/prognosis/goals) with good accuracy. 2) Patient will demonstrate increased active/passive range of motion of their RUE to [facility] for ADL/IADL completion. 3) Patient will demonstrate increased /pinch strength of at least 10 / 5 pinch pounds of their R hand. 4) Patient to report decreased pain in their affected R upper extremity from 6/10 to 2/10 or less with resistive functional use. 5) Patient to report 100% compliance with their splint wear, care, and precautions if needed. 6) Patient will be knowledgeable of edema control techniques as evident with decreases from moderate to mild/none. 7) Patient will demonstrate a non-tender/non-adherent scar. 8) Patient will report ADL functions as Mod I/I using RUE. 9) Patient will demonstrate improved functional activity tolerance from fair -  to good + for ADL/IADL completion. 10) Patient will decrease QuickDASH score to 30% or less for increased participation in daily functional activities.     Pain Level: 5/10 in the pinky, soreness/aching    Subjective: Pt states \"after the marbles last week my pain was horrible\". Objective:  Updated POC to be completed by 1/1/2022. INTERVENTION: COMPLETED: SPECIFICS/COMMENTS:   Modality:     Paraffin dip  x For soft tissue warm up - therapist did gentle joint mob while on. AROM:     R  digits x  X    x - 5th digit PIP flexion to  pom poms  -opposition with marbles alternating between ring finger and small finger  -in hand manipulation to increase composite fist with marbles (4)   RUE  x Jt blocking: MCP, PIP and DIP x15    AAROM:               PROM/Stretching:     jt mob  x Gentle grade 1 to SF PIP and DIP jt. Before doing AROM         Scar Mass/Edema Control:     Scar and retrograde massage X  x -R SF  - fitted with sm finger sleeve ( stretched first ) to wear as needed during the day          Strengthening:               Other:     HEP X  reviewed all and given handout. Pt NOT to do finger passive ext ( too much stress). Cont with ext tube at night.   -issued silicone gel sleeve to decrease pain and edema           Assessment/Comments: Pt is making Good progress toward stated plan of care. Pt presents with increased pain this date-residual from last session after increased finger flexion exercises. Light PROM performed with no increase in pain, ain increased with active ROM. Pt able to perform all fine motor exercises well this date. Silicone gel seeve issued this date to decrease pain and edema intermittently throughout the date with the increased compression.  Pt instructed to continue to wear compression sleeve most of the day as well as extension tube at night.     -Rehab Potential: Good  -Requires OT Follow Up: Yes  Time In: 2:00 pm             Time Out: 3:00 pm              Visit #: 4    Treatment Charges: Mins Units   Modalities=paraffin 15 1   Ther Exercise 25 2   Manual Therapy 20 1   Thera Activities     ADL/Home Mgt      Neuro Re-education     Gait Training Group Therapy     Non-Billable Service Time     Other     Total Time/Units 60 4       -Response to Treatment: Pt pleased with her progress and glad her pain was a little less. Goals: Goals for pt can be see on initial eval occurring on 12/1/2021    Plan:   [x]  Continues Plan of care: Treatment covered based on POC and graduated to patient's progress. Pt education continues at each visit to obtain maximum benefits from skilled OT intervention. []  Alter Plan of care:   []  Discharge:       [address] [place] [address] R/KALEY, belen Tobin [phone] #[phone]

## 2021-12-16 ENCOUNTER — OFFICE VISIT (OUTPATIENT)
Dept: ORTHOPEDIC SURGERY | Age: 51
End: 2021-12-16

## 2021-12-16 ENCOUNTER — TREATMENT (OUTPATIENT)
Dept: OCCUPATIONAL THERAPY | Age: 51
End: 2021-12-16
Payer: COMMERCIAL

## 2021-12-16 VITALS — WEIGHT: 103 LBS | HEIGHT: 64 IN | BODY MASS INDEX: 17.58 KG/M2 | RESPIRATION RATE: 20 BRPM

## 2021-12-16 DIAGNOSIS — S62.616A CLOSED DISPLACED FRACTURE OF PROXIMAL PHALANX OF RIGHT LITTLE FINGER, INITIAL ENCOUNTER: ICD-10-CM

## 2021-12-16 DIAGNOSIS — S62.616A CLOSED DISPLACED FRACTURE OF PROXIMAL PHALANX OF RIGHT LITTLE FINGER, INITIAL ENCOUNTER: Primary | ICD-10-CM

## 2021-12-16 PROCEDURE — 97110 THERAPEUTIC EXERCISES: CPT | Performed by: OCCUPATIONAL THERAPIST

## 2021-12-16 PROCEDURE — 97140 MANUAL THERAPY 1/> REGIONS: CPT | Performed by: OCCUPATIONAL THERAPIST

## 2021-12-16 PROCEDURE — 97018 PARAFFIN BATH THERAPY: CPT | Performed by: OCCUPATIONAL THERAPIST

## 2021-12-16 PROCEDURE — 99024 POSTOP FOLLOW-UP VISIT: CPT | Performed by: ORTHOPAEDIC SURGERY

## 2021-12-16 RX ORDER — HYDROCODONE BITARTRATE AND ACETAMINOPHEN 5; 325 MG/1; MG/1
1 TABLET ORAL EVERY 6 HOURS PRN
Qty: 10 TABLET | Refills: 0 | Status: SHIPPED | OUTPATIENT
Start: 2021-12-16 | End: 2021-12-19

## 2021-12-16 NOTE — PATIENT INSTRUCTIONS
Patient Education        Finger Fracture: Rehab Exercises  Your Care Instructions  Here are some examples of typical rehabilitation exercises for your condition. Start each exercise slowly. Ease off the exercise if you start to have pain. Your doctor or your physical or occupational therapist will tell you when you can start these exercises and which ones will work best for you. How to do the exercises  Finger extension    1. Place your hand flat on a table, palm down. 2. Lift and then lower your affected finger off the table. 3. Repeat 8 to 12 times. MP extension    1. Place your good hand on a table, palm up. Put your hand with the affected finger on top of your good hand with your fingers wrapped around the thumb of your good hand like you are making a fist.  2. Slowly uncurl the joints of your hand with the affected finger where your fingers connect to your hand so that only the top two joints of your fingers are bent. Your fingers will look like a hook. 3. Move back to your starting position, with your fingers wrapped around your good thumb. 4. Repeat 8 to 12 times. DIP flexion    1. With your good hand, grasp your affected finger. Your thumb will be on the top side of your finger just below the joint that is closest to your fingernail. 2. Slowly bend your affected finger only at the joint closest to your fingernail. Hold for about 6 seconds. 3. Repeat 8 to 12 times. PIP extension (with MP extension)    1. Place your good hand on a table, palm up. Put your hand with the affected finger on top of your good hand. 2. Use the thumb and fingers of your good hand to grasp below the middle joint of your affected finger. 3. Bend and then straighten the last two joints of your affected finger. 4. Repeat 8 to 12 times. Isolated PIP flexion    1. Place the hand with the affected finger flat on a table, palm up. With your other hand, press down on the fingers that are not affected.  Your affected finger will results and keep a list of the medicines you take. Where can you learn more? Go to [email or web address] and sign in to your [employer] account. Enter V403 in the [facility] box to learn more about \"Finger Fracture: Rehab Exercises. \"     If you do not have an account, please click on the \"Sign Up Now\" link. Current as of: July 1, 2021               Content Version: 13.0  © 2006-2021 [employer]. Care instructions adapted under license by [facility] ([facility]). If you have questions about a medical condition or this instruction, always ask your healthcare professional. [address] any warranty or liability for your use of this information.

## 2021-12-16 NOTE — PROGRESS NOTES
OCCUPATIONAL THERAPY PROGRESS NOTE    Date:  2021  Initial Evaluation Date: 2021    Patient Name:  Vikas Curtis    :  1970      Restrictions/Precautions: Follow ORIF proximal phalanx fracture protocol, low fall risk  Diagnosis: S62.616A (ICD-10-CM) - Closed displaced fracture of proximal phalanx of right little finger, initial encounter                                                         Date of Surgery/Injury: 2021  ( 4 weeks post op)     Insurance/Certification information: [address] of care signed (Y/N): N  Visit# / total visits:  approved from 2021 - 3/1/2022     Referring Practitioner:  Dr. Bethel Gaxiola  Specific Practitioner Orders: Right hand/digits ROM as tolerated, modalities prn     Assessment of current deficits   []? Functional mobility                         [x]? ADLs          [x]? Strength                  []? Cognition   []? Functional transfers           [x]? IADLs         [x]? Safety Awareness  [x]? Endurance   [x]? Fine Motor Coordination    []? Balance      []? Vision/perception   []? Sensation     [x]? Gross Motor Coordination [x]? ROM           [x]? Pain                        [x]? Edema          [x]? Scar Adhesion/Skin Integrity      OT PLAN OF CARE   OT POC based on physician orders, patient diagnosis and results of clinical assessment     Frequency/Duration: 1-2x/week for 12-14 visits. Frequency/Duration 1-2x / week for 10-02 [facility].   Certification period From: 2021  To: 2021     Specific OT Treatment to include:      [x]? Instruction in HEP                   Modalities:  [x]?  Therapeutic Exercise                 [x]? Ultrasound               [x]? Electrical Stimulation/Attended  [x]? PROM/Stretching                    [x]? Fluidotherapy          [x]?   Paraffin                   [x]? AAROM  [x]?  AROM                 [x]? Iontophoresis:   [x]? Zackery Knutson                                       []? Neuromuscular Re-Ed   [x]? ADL/IADL re-training    [x]?  Therapeutic Activity                  [x]? Pain Management with/without modalities PRN                 [x]?  Manual Therapy                      [x]? Splinting                                   [x]? Scar Management                   []?Joint Protection/Training  []? Ergonomics                             [x]?  Joint Mobilization                      []? Adaptive Equipment Assessment/Training                             [x]?  Manual Edema Mobilization   [x]?  Myofascial Release                 []? Energy Conservation/Work Simplification  [x]? GM/FM Coordination                []? Safety retraining/education per  individual diagnosis/goals  [x]? Desensitization        Patient Specific Goal: Patient would like improved motion of the ROM                             GOALS (Long term same as Short term):  1) Patient will demonstrate good understanding of home program (exercises/activities/diagnosis/prognosis/goals) with good accuracy. 2) Patient will demonstrate increased active/passive range of motion of their RUE to [facility] for ADL/IADL completion. 3) Patient will demonstrate increased /pinch strength of at least 10 / 5 pinch pounds of their R hand. 4) Patient to report decreased pain in their affected R upper extremity from 6/10 to 2/10 or less with resistive functional use. 5) Patient to report 100% compliance with their splint wear, care, and precautions if needed. 6) Patient will be knowledgeable of edema control techniques as evident with decreases from moderate to mild/none. 7) Patient will demonstrate a non-tender/non-adherent scar. 8) Patient will report ADL functions as Mod I/I using RUE. 9) Patient will demonstrate improved functional activity tolerance from fair -  to good + for ADL/IADL completion. 10) Patient will decrease QuickDASH score to 30% or less for increased participation in daily functional activities.     Pain Level: 5/10 in the pinky, soreness/aching    Subjective: Pt states \"I really like the silicone sleeve\". Objective:  Updated POC to be completed by 1/1/2022. INTERVENTION: COMPLETED: SPECIFICS/COMMENTS:   Modality:     Paraffin dip  x For soft tissue warm up - therapist did gentle joint mob while on. AROM:     R  digits x  X    x - 5th digit PIP flexion to  pom poms  -opposition with marbles alternating between ring finger and small finger  -in hand manipulation to increase composite fist with marbles (4)   RUE  x Jt blocking: MCP, PIP and DIP x15    AAROM:               PROM/Stretching:     jt mob  x Gentle grade 1 to SF PIP and DIP jt. Before doing AROM         Scar Mass/Edema Control:     Scar and retrograde massage X  x -R SF  - fitted with sm finger sleeve ( stretched first ) to wear as needed during the day          Strengthening:               Other:     HEP X  reviewed all and given handout. Pt NOT to do finger passive ext ( too much stress). Cont with ext tube at night.   -issued silicone gel sleeve to decrease pain and edema           Assessment/Comments: Pt is making Good progress toward stated plan of care. Pt tolerated session well with all exercises and AROM and PROM. Pt to see referring physician this date-will increase per physician.     -Rehab Potential: Good  -Requires OT Follow Up: Yes  Time In: 1:00 pm             Time Out: 2:00 pm              Visit #: 5    Treatment Charges: Mins Units   Modalities=paraffin 15 1   Ther Exercise 25 2   Manual Therapy 20 1   Thera Activities     ADL/Home Mgt      Neuro Re-education     Gait Training     Group Therapy     Non-Billable Service Time     Other     Total Time/Units 60 4       -Response to Treatment: Pt pleased with her progress and glad her pain was a little less. Goals: Goals for pt can be see on initial eval occurring on 12/1/2021    Plan:   [x]  Continues Plan of care: Treatment covered based on POC and graduated to patient's progress.  Pt

## 2021-12-16 NOTE — PROGRESS NOTES
HPI: Patient presents today 5 weeks status post right small finger proximal phalanx ORIF. She is also nonop management for a clavicle fracture. Continued pain with stiffness. States she still is in therapy      Physical Exam: Incision well healed with scar mature. No erythema or signs of infection. Stiffness with flexion extension of the small finger. Flexion to 90 deg at the MCPJ and 40 degrees at the PIP. Median, ulnar, radial nerves intact light touch. Brisk capillary refill. Negative tenderness over the distal clavicle. Xray: x-rays of the right small finger were obtained today in the office and reviewed with the patient. 3 views: AP, lateral, oblique: demonstrate stable fixation of right small finger proximal phalanx fracture when compared to intraoperative fluoroscopy. No changes in hardware. Interval healing noted  Impression: Stable right small finger proximal phalanx ORIF    Assessment: 5 weeks status post right small finger proximal phalanx ORIF  Healing distal one third clavicle fracture    Plan:      Findings discussed with the patient. She is progressing well and healing her fractures well. She is OK to progress with no restrictions and work on ROM at the small digit. We will have her return to office in 4-6 weeks with repeat xrays of the small digit as well as clavicle    Pedrito Alex DO  12/16/2021    I have seen and evaluated the patient and agree with the above assessment and plan on today's visit. I have performed the key components of the history and physical examination with significant findings of postop. I concur with the findings and plan as documented.     Trevon Ji MD  12/16/2021

## 2021-12-30 ENCOUNTER — TREATMENT (OUTPATIENT)
Dept: OCCUPATIONAL THERAPY | Age: 51
End: 2021-12-30

## 2022-02-15 NOTE — PROGRESS NOTES
[facility]                Phone: [phone]  Fax: [phone]     Occupational Therapy  Out Patient Discharge Summary     Date:  2/15/2022    Patient Name:  Hannah Madsen   :  1970 MRN: [id number]    Restrictions/Precautions:  Follow ORIF proximal phalanx fracture protocol, low fall risk  Diagnosis: S62.616A (ICD-10-CM) - Closed displaced fracture of proximal phalanx of right little finger, initial encounter                                                         Date of Surgery/Injury: 2021  ( 4 weeks post op)     Insurance/Certification information: [facility]  Plan of care signed (Y/N): N  Visit# / total visits:  approved from 2021 - 3/1/2022     Referring Practitioner:  Dr. Dee Barbosa  Specific Practitioner Orders: Right hand/digits ROM as tolerated, modalities prn     Assessment of current deficits   []? ? Functional mobility                         [x]? ? ADLs          [x]? ? Strength                  []? ? Cognition   []? ? Functional transfers           [x]? ? IADLs         [x]? ? Safety Awareness  [x]? ? Endurance   [x]? ? Fine Motor Coordination    []? ? Balance      []? ? Vision/perception   []? ? Sensation     [x]? ? Gross Motor Coordination [x]? ? ROM           [x]? ? Pain                        [x]? ? Edema          [x]? ? Scar Adhesion/Skin Integrity      OT PLAN OF CARE   OT POC based on physician orders, patient diagnosis and results of clinical assessment     Frequency/Duration: 1-2x/week for 12-14 visits. Frequency/Duration 1-2x / week [facility] [id number].   Certification period From: 2021  To: 2021     Specific OT Treatment to include:      [x]? ? Instruction in HEP                   Modalities:  [x]? ? Therapeutic Exercise                 [x]? ? Ultrasound               [x]? ? Electrical Stimulation/Attended  [x]? ? PROM/Stretching                    [x]? ? Fluidotherapy          [x]? ?  Paraffin                   [x]? ? AAROM  [x]? ? AROM                 [x]? ? Iontophoresis:   [x]? ?Kym Geovani  []? ? Neuromuscular Re-Ed            [x]? ? ADL/IADL re-training    [x]? ? Therapeutic Activity                  [x]? ? Pain Management with/without modalities PRN                 [x]? ? Manual Therapy                      [x]? ? Splinting                                   [x]? ? Scar Management                   []? ? Joint Protection/Training  []? ? Ergonomics                             [x]? ? Joint Mobilization                      []? ? Adaptive Equipment Assessment/Training                             [x]? ? Manual Edema Mobilization   [x]? ? Myofascial Release                 []? ? Energy Conservation/Work Simplification  [x]? ? GM/FM Coordination                []? ? Safety retraining/education per  individual diagnosis/goals  [x]? ? Desensitization        Patient Specific Goal: Patient would like improved motion of the ROM                             GOALS (Long term same as Short term):  1) Patient will demonstrate good understanding of home program (exercises/activities/diagnosis/prognosis/goals) with good accuracy.    2) Patient will demonstrate increased active/passive range of motion of their RUE to WFLs for ADL/IADL completion. 3) Patient will demonstrate increased /pinch strength of at least 10 / 5 pinch pounds of their R hand.   4) Patient to report decreased pain in their affected R upper extremity from 6/10 to 2/10 or less with resistive functional use.   5) Patient to report 100% compliance with their splint wear, care, and precautions if needed.   6) Patient will be knowledgeable of edema control techniques as evident with decreases from moderate to mild/none. 7) Patient will demonstrate a non-tender/non-adherent scar.   8) Patient will report ADL functions as Mod I/I using RUE.   9) Patient will demonstrate improved functional activity tolerance from fair -  to good + for ADL/IADL completion.   10) Patient will decrease QuickDASH score to 30% or less for increased participation in daily functional activities. REASON FOR DISCHARGE: Pt saw referring physician where restrictions were lifted, no future appointments were made for occupational therapy. PATIENT EDUCATION/INSTRUCTIONS: continue with HEP and follow up with referring physician, new script is required to return to therapy. Thank you for the opportunity to work with your patient. If you have questions or comments, please feel free to contact us by phone or fax. Electronically Signed by:  [address] [place], [place] [address]DENISE, OTR/KALEY [id number]  2/15/2022

## 2023-01-19 ENCOUNTER — HOSPITAL ENCOUNTER (EMERGENCY)
Age: 53
Discharge: HOME | End: 2023-01-19
Attending: EMERGENCY MEDICINE
Payer: COMMERCIAL

## 2023-01-19 ENCOUNTER — APPOINTMENT (OUTPATIENT)
Dept: CT IMAGING | Age: 53
End: 2023-01-19
Payer: COMMERCIAL

## 2023-01-19 VITALS
SYSTOLIC BLOOD PRESSURE: 127 MMHG | RESPIRATION RATE: 12 BRPM | TEMPERATURE: 97.6 F | OXYGEN SATURATION: 96 % | WEIGHT: 86 LBS | BODY MASS INDEX: 15.24 KG/M2 | HEART RATE: 67 BPM | HEIGHT: 63 IN | DIASTOLIC BLOOD PRESSURE: 97 MMHG

## 2023-01-19 DIAGNOSIS — N39.0 URINARY TRACT INFECTION WITHOUT HEMATURIA, SITE UNSPECIFIED: ICD-10-CM

## 2023-01-19 DIAGNOSIS — R55 SYNCOPE AND COLLAPSE: Primary | ICD-10-CM

## 2023-01-19 LAB
ALBUMIN SERPL-MCNC: 3.8 G/DL (ref 3.5–5.2)
ALP BLD-CCNC: 164 U/L (ref 35–104)
ALT SERPL-CCNC: 32 U/L (ref 0–32)
AMMONIA: 45.6 UMOL/L (ref 11–51)
ANION GAP SERPL CALCULATED.3IONS-SCNC: 12 MMOL/L (ref 7–16)
ANISOCYTOSIS: ABNORMAL
AST SERPL-CCNC: 81 U/L (ref 0–31)
BACTERIA: ABNORMAL /HPF
BASOPHILS ABSOLUTE: 0.03 E9/L (ref 0–0.2)
BASOPHILS RELATIVE PERCENT: 0.9 % (ref 0–2)
BILIRUB SERPL-MCNC: 1.5 MG/DL (ref 0–1.2)
BILIRUBIN DIRECT: 0.7 MG/DL (ref 0–0.3)
BILIRUBIN URINE: NEGATIVE
BILIRUBIN, INDIRECT: 0.8 MG/DL (ref 0–1)
BLOOD, URINE: NEGATIVE
BUN BLDV-MCNC: 6 MG/DL (ref 6–20)
CALCIUM SERPL-MCNC: 8.7 MG/DL (ref 8.6–10.2)
CHLORIDE BLD-SCNC: 103 MMOL/L (ref 98–107)
CLARITY: CLEAR
CO2: 24 MMOL/L (ref 22–29)
COLOR: YELLOW
CREAT SERPL-MCNC: 0.5 MG/DL (ref 0.5–1)
EKG ATRIAL RATE: 58 BPM
EKG P AXIS: 41 DEGREES
EKG P-R INTERVAL: 156 MS
EKG Q-T INTERVAL: 478 MS
EKG QRS DURATION: 102 MS
EKG QTC CALCULATION (BAZETT): 469 MS
EKG R AXIS: 83 DEGREES
EKG T AXIS: 47 DEGREES
EKG VENTRICULAR RATE: 58 BPM
EOSINOPHILS ABSOLUTE: 0.07 E9/L (ref 0.05–0.5)
EOSINOPHILS RELATIVE PERCENT: 2 % (ref 0–6)
EPITHELIAL CELLS, UA: ABNORMAL /HPF
GFR SERPL CREATININE-BSD FRML MDRD: >60 ML/MIN/1.73
GLUCOSE BLD-MCNC: 97 MG/DL (ref 74–99)
GLUCOSE URINE: NEGATIVE MG/DL
HCT VFR BLD CALC: 31.8 % (ref 34–48)
HEMOGLOBIN: 10.6 G/DL (ref 11.5–15.5)
IMMATURE GRANULOCYTES #: 0.02 E9/L
IMMATURE GRANULOCYTES %: 0.6 % (ref 0–5)
INFLUENZA A BY PCR: NOT DETECTED
INFLUENZA B BY PCR: NOT DETECTED
KETONES, URINE: NEGATIVE MG/DL
LACTIC ACID: 2.2 MMOL/L (ref 0.5–2.2)
LEUKOCYTE ESTERASE, URINE: ABNORMAL
LIPASE: 78 U/L (ref 13–60)
LYMPHOCYTES ABSOLUTE: 0.57 E9/L (ref 1.5–4)
LYMPHOCYTES RELATIVE PERCENT: 16.6 % (ref 20–42)
MAGNESIUM: 1.6 MG/DL (ref 1.6–2.6)
MCH RBC QN AUTO: 35.9 PG (ref 26–35)
MCHC RBC AUTO-ENTMCNC: 33.3 % (ref 32–34.5)
MCV RBC AUTO: 107.8 FL (ref 80–99.9)
MONOCYTES ABSOLUTE: 0.42 E9/L (ref 0.1–0.95)
MONOCYTES RELATIVE PERCENT: 12.2 % (ref 2–12)
NEUTROPHILS ABSOLUTE: 2.32 E9/L (ref 1.8–7.3)
NEUTROPHILS RELATIVE PERCENT: 67.7 % (ref 43–80)
NITRITE, URINE: POSITIVE
PDW BLD-RTO: 15.3 FL (ref 11.5–15)
PH UA: 7 (ref 5–9)
PLATELET # BLD: 50 E9/L (ref 130–450)
PLATELET CONFIRMATION: NORMAL
PMV BLD AUTO: 10.2 FL (ref 7–12)
POTASSIUM SERPL-SCNC: 3.1 MMOL/L (ref 3.5–5)
PROTEIN UA: NEGATIVE MG/DL
RBC # BLD: 2.95 E12/L (ref 3.5–5.5)
RBC UA: ABNORMAL /HPF (ref 0–2)
SARS-COV-2, NAAT: NOT DETECTED
SODIUM BLD-SCNC: 139 MMOL/L (ref 132–146)
SPECIFIC GRAVITY UA: <=1.005 (ref 1–1.03)
TOTAL PROTEIN: 6.1 G/DL (ref 6.4–8.3)
TROPONIN, HIGH SENSITIVITY: 6 NG/L (ref 0–9)
TROPONIN, HIGH SENSITIVITY: 8 NG/L (ref 0–9)
UROBILINOGEN, URINE: 0.2 E.U./DL
WBC # BLD: 3.4 E9/L (ref 4.5–11.5)
WBC UA: ABNORMAL /HPF (ref 0–5)

## 2023-01-19 PROCEDURE — 96365 THER/PROPH/DIAG IV INF INIT: CPT

## 2023-01-19 PROCEDURE — 2580000003 HC RX 258: Performed by: RADIOLOGY

## 2023-01-19 PROCEDURE — 72125 CT NECK SPINE W/O DYE: CPT

## 2023-01-19 PROCEDURE — 87088 URINE BACTERIA CULTURE: CPT

## 2023-01-19 PROCEDURE — 6360000002 HC RX W HCPCS

## 2023-01-19 PROCEDURE — 74177 CT ABD & PELVIS W/CONTRAST: CPT

## 2023-01-19 PROCEDURE — 36415 COLL VENOUS BLD VENIPUNCTURE: CPT

## 2023-01-19 PROCEDURE — 87502 INFLUENZA DNA AMP PROBE: CPT

## 2023-01-19 PROCEDURE — 87186 SC STD MICRODIL/AGAR DIL: CPT

## 2023-01-19 PROCEDURE — 99285 EMERGENCY DEPT VISIT HI MDM: CPT

## 2023-01-19 PROCEDURE — 80048 BASIC METABOLIC PNL TOTAL CA: CPT

## 2023-01-19 PROCEDURE — 83690 ASSAY OF LIPASE: CPT

## 2023-01-19 PROCEDURE — 71275 CT ANGIOGRAPHY CHEST: CPT

## 2023-01-19 PROCEDURE — 70450 CT HEAD/BRAIN W/O DYE: CPT

## 2023-01-19 PROCEDURE — 6370000000 HC RX 637 (ALT 250 FOR IP)

## 2023-01-19 PROCEDURE — 93010 ELECTROCARDIOGRAM REPORT: CPT | Performed by: INTERNAL MEDICINE

## 2023-01-19 PROCEDURE — 85025 COMPLETE CBC W/AUTO DIFF WBC: CPT

## 2023-01-19 PROCEDURE — 6360000004 HC RX CONTRAST MEDICATION: Performed by: RADIOLOGY

## 2023-01-19 PROCEDURE — 82140 ASSAY OF AMMONIA: CPT

## 2023-01-19 PROCEDURE — 87635 SARS-COV-2 COVID-19 AMP PRB: CPT

## 2023-01-19 PROCEDURE — 2580000003 HC RX 258

## 2023-01-19 PROCEDURE — 83735 ASSAY OF MAGNESIUM: CPT

## 2023-01-19 PROCEDURE — 96375 TX/PRO/DX INJ NEW DRUG ADDON: CPT

## 2023-01-19 PROCEDURE — 80076 HEPATIC FUNCTION PANEL: CPT

## 2023-01-19 PROCEDURE — 83605 ASSAY OF LACTIC ACID: CPT

## 2023-01-19 PROCEDURE — 84484 ASSAY OF TROPONIN QUANT: CPT

## 2023-01-19 PROCEDURE — 81001 URINALYSIS AUTO W/SCOPE: CPT

## 2023-01-19 PROCEDURE — 93005 ELECTROCARDIOGRAM TRACING: CPT

## 2023-01-19 PROCEDURE — 87077 CULTURE AEROBIC IDENTIFY: CPT

## 2023-01-19 PROCEDURE — 96361 HYDRATE IV INFUSION ADD-ON: CPT

## 2023-01-19 RX ORDER — 0.9 % SODIUM CHLORIDE 0.9 %
1000 INTRAVENOUS SOLUTION INTRAVENOUS ONCE
Status: COMPLETED | OUTPATIENT
Start: 2023-01-19 | End: 2023-01-19

## 2023-01-19 RX ORDER — ONDANSETRON 2 MG/ML
4 INJECTION INTRAMUSCULAR; INTRAVENOUS ONCE
Status: COMPLETED | OUTPATIENT
Start: 2023-01-19 | End: 2023-01-19

## 2023-01-19 RX ORDER — SODIUM CHLORIDE 0.9 % (FLUSH) 0.9 %
10 SYRINGE (ML) INJECTION PRN
Status: COMPLETED | OUTPATIENT
Start: 2023-01-19 | End: 2023-01-19

## 2023-01-19 RX ORDER — POTASSIUM CHLORIDE 7.45 MG/ML
10 INJECTION INTRAVENOUS ONCE
Status: COMPLETED | OUTPATIENT
Start: 2023-01-19 | End: 2023-01-19

## 2023-01-19 RX ORDER — CEFDINIR 300 MG/1
300 CAPSULE ORAL 2 TIMES DAILY
Qty: 20 CAPSULE | Refills: 0 | Status: SHIPPED | OUTPATIENT
Start: 2023-01-19 | End: 2023-01-29

## 2023-01-19 RX ORDER — POTASSIUM CHLORIDE 20 MEQ/1
40 TABLET, EXTENDED RELEASE ORAL ONCE
Status: COMPLETED | OUTPATIENT
Start: 2023-01-19 | End: 2023-01-19

## 2023-01-19 RX ADMIN — POTASSIUM CHLORIDE 40 MEQ: 1500 TABLET, EXTENDED RELEASE ORAL at 02:52

## 2023-01-19 RX ADMIN — SODIUM CHLORIDE, PRESERVATIVE FREE 10 ML: 5 INJECTION INTRAVENOUS at 02:07

## 2023-01-19 RX ADMIN — IOPAMIDOL 60 ML: 755 INJECTION, SOLUTION INTRAVENOUS at 02:11

## 2023-01-19 RX ADMIN — POTASSIUM CHLORIDE 10 MEQ: 7.45 INJECTION INTRAVENOUS at 02:52

## 2023-01-19 RX ADMIN — SODIUM CHLORIDE 1000 ML: 9 INJECTION, SOLUTION INTRAVENOUS at 01:11

## 2023-01-19 RX ADMIN — ONDANSETRON 4 MG: 2 INJECTION INTRAMUSCULAR; INTRAVENOUS at 01:39

## 2023-01-19 RX ADMIN — CEFTRIAXONE 1000 MG: 1 INJECTION, POWDER, FOR SOLUTION INTRAMUSCULAR; INTRAVENOUS at 02:52

## 2023-01-19 ASSESSMENT — ENCOUNTER SYMPTOMS
PHOTOPHOBIA: 0
SHORTNESS OF BREATH: 1
EYE PAIN: 0
BACK PAIN: 0
FACIAL SWELLING: 0
VOMITING: 0
CHOKING: 0
DIARRHEA: 0
COUGH: 0
CONSTIPATION: 0
SORE THROAT: 0
NAUSEA: 1
ABDOMINAL PAIN: 1

## 2023-01-19 ASSESSMENT — PAIN - FUNCTIONAL ASSESSMENT
PAIN_FUNCTIONAL_ASSESSMENT: NONE - DENIES PAIN
PAIN_FUNCTIONAL_ASSESSMENT: NONE - DENIES PAIN
PAIN_FUNCTIONAL_ASSESSMENT: 0-10

## 2023-01-19 ASSESSMENT — PAIN SCALES - GENERAL
PAINLEVEL_OUTOF10: 0
PAINLEVEL_OUTOF10: 8

## 2023-01-19 ASSESSMENT — PAIN DESCRIPTION - LOCATION: LOCATION: CHEST

## 2023-01-19 ASSESSMENT — PAIN DESCRIPTION - DESCRIPTORS: DESCRIPTORS: BURNING;ACHING

## 2023-01-19 ASSESSMENT — PAIN DESCRIPTION - PAIN TYPE: TYPE: ACUTE PAIN

## 2023-01-19 NOTE — CONSULTS
Department of Orthopedic Surgery  [facility] Harrison Ley MD  Consult      Reason for Consult:  Left hip pain    Consulting Physician: Dr Meche Burgos:                The patient is a 45 y.o. female who presents with 2-month history of continued left hip pain. She was originally seen at [facility].  X-rays were negative for fracture. A CT scan was obtained and demonstrated comminuted trochanteric fracture. Treating physician then sent her to [facility].  She reports that she recently moved from [place]. During her move she fell onto her left hip. Since then she has had difficulty with ambulation. Her pain has been poorly controlled. She reports that she can no longer manage the pain. She does have a CD with her from [employer]. Images were reviewed. There is a comminuted tuberosity fracture with possible extension into the basicervical neck region. No significant displacement. No fracture seen on plain film. Past Medical History:        Diagnosis Date    Cirrhosis ([facility] [place] [address] 2013     Past Surgical History:        Procedure Laterality Date    JOINT REPLACEMENT Left     shoulder     Current Medications:   Current Facility-Administered Medications: sodium chloride flush 0.9 % injection 10 mL, 10 mL, Intravenous, PRN  Allergies:  Patient has no known allergies. Social History:   TOBACCO:   reports that she has been smoking. She has been smoking about 0.25 packs per day. She has never used smokeless tobacco.  ETOH:   reports current alcohol use of about 2.0 standard drinks of alcohol per week. DRUGS:   reports current drug use. Drug: Marijuana. ACTIVITIES OF DAILY LIVING:    OCCUPATION:    Family History:   No family history on file.     REVIEW OF SYSTEMS:  CONSTITUTIONAL:  negative  EYES:  negative  HEENT:  negative  RESPIRATORY:  negative  CARDIOVASCULAR:  negative  GASTROINTESTINAL:  negative  GENITOURINARY:  negative  INTEGUMENT/BREAST: Her symptoms should not be getting worse on the medication.  She may not notice improvement until tomorrow, but if diarrhea is not getting better by tomorrow please let us know.  As for the other concerns agree with informing urology.   negative  HEMATOLOGIC/LYMPHATIC:  negative  ALLERGIC/IMMUNOLOGIC:  negative  ENDOCRINE:  negative  MUSCULOSKELETAL:  Left hip pain  NEUROLOGICAL:  negative  BEHAVIOR/PSYCH:  negative    PHYSICAL EXAM:    VITALS:  /69   Pulse 80   Temp 98.2 °F (36.8 °C)   Resp 16   Ht 5' 4\" (1.626 m)   Wt 100 lb (45.4 kg)   SpO2 96%   BMI 17.16 kg/m²   CONSTITUTIONAL:  awake, alert, cooperative, no apparent distress, and appears stated age  EYES:  Lids and lashes normal, pupils equal, round and reactive to light, extra ocular muscles intact, sclera clear, conjunctiva normal  ENT:  Normocephalic, without obvious abnormality, atraumatic, sinuses nontender on palpation, external ears without lesions, oral pharynx with moist mucus membranes, tonsils without erythema or exudates, gums normal and good dentition. NECK:  Supple, symmetrical, trachea midline, no adenopathy, thyroid symmetric, not enlarged and no tenderness, skin normal  LUNGS:  CTA  CARDIOVASCULAR:  RRR  ABDOMEN:  Soft,nttp  CHEST/BREASTS:  deferred  GENITAL/URINARY:  deferred  NEUROLOGIC:  Awake, alert, oriented to name, place and time. Cranial nerves II-XII are grossly intact. Motor is 5 out of 5 bilaterally. Sensory is intact. MUSCULOSKELETAL:    Patient seen in the ER on a gurney. Tender to palpation directly over the left greater trochanteric region. No significant groin pain with hip flexion internal or external rotation. Nontender about the knee and ankle. Gastrocsoleus tibialis anterior and EHL function 5/5.  2+ dorsalis pedis pulse. L2-S1 sensation intact to light touch. Distally neurovascular intact.     DATA:    CBC:   Lab Results   Component Value Date    WBC 4.6 05/21/2021    RBC 3.48 05/21/2021    HGB 12.5 05/21/2021    HCT 36.8 05/21/2021    .7 05/21/2021    MCH 35.9 05/21/2021    MCHC 34.0 05/21/2021    RDW 14.6 05/21/2021     05/21/2021    MPV 10.4 05/21/2021     PT/INR:  No results found for: PROTIME, INR    Radiology Review: Plain films from [facility] as well as CT scan from [facility] was was reviewed. No significant fracture appreciated on plain films. However on the CT scan coronal images in the bone window demonstrates a comminuted greater tuberosity with some evidence for extension inferiorly but not through the neck region. The report that came with the CD also confirmed trochanteric fracture without back extension. Negative pelvic films were obtained. This demonstrates a probable nondisplaced intertrochanteric fracture with comminution of the greater trochanter. IMPRESSION:  · Left comminuted greater trochanteric fracture with probable propagation into a intertrochanteric fracture  · Concern for possible extension of trochanteric fracture into basicervical neck region with continued pain of 2 months duration  · Cirrhosis    PLAN:  Today's findings were explained the patient. Patient is being admitted to the medical service for pain control. Patient is inquiring about surgical intervention options. Discussed surgical invention for fracture stabilization. I explained the risks, benefits, alternatives and complications of surgery with the patient including but not limited to the risks of death, possible damage to nerves, vessels, or tendons, possible infection, possible nonunion, possible malunion, leg length discrepancy and malrotation,  Deep Venous Thrombosis (DVT), Pulmonary Emboli (PE), post-operative DVT prophylaxis, possible hardware failure, possible need for hardware removal, stiffness, as well as the possible need further surgery and unanticipated complications. The patient voiced understanding and all questions were answered. The patient elected to proceed with surgical intervention.      Darryn Abarca MD  5/21/2021

## 2023-01-19 NOTE — DISCHARGE INSTRUCTIONS
Please return to the ED if symptoms worsen, persist, recur. Please take all your medications as prescribed. Please follow-up with PCP as discussed. Please remain hydrated and reduce alcohol intake and smoking frequency.

## 2023-01-19 NOTE — ED NOTES
Patient arrived to the ER via squad after a syncopal episode that occurred at the patients home. Fall was witnessed by patient's spouse. Patient states that she had several glasses of wine this evening and accidentally took and extra trazodone. Patient states that after going outside to smoke a cigarette, she stood up and \"passed out\". Upon arrival, patient having difficulty answering questions and following commands. Physician present at bedside, ekg completed and IV access obtained.       [facility] [place]  76/25/26 [id number]

## 2023-01-19 NOTE — ED PROVIDER NOTES
[facility]  Department of Emergency Medicine     Written by: Ruben Ashton MD  Patient Name: Sushma Bukc  Attending Provider: Elvia Wood DO  Admit Date: 2023 12:23 AM  MRN: [id number]                   : 1970        Chief Complaint   Patient presents with    Loss of Consciousness     Patient outside smoking and \"passed out\"    - Chief complaint    50-year-old female history of cirrhosis due to alcohol abuse for many years, chronic smoking history for about 35 years 1 pack a day presents the ED with complaints of syncope. The patient was smoking outside with her  in the backyard, and she stood up from smoking a cigarette to walk to the house, she had felt very lightheaded and nauseated, and then collapsed to the floor. When she woke up she felt like she had central chest pain and shortness of breath. As per , patient did not hit her head, however she is collapse for few seconds. EMS state that they arrived to the house, brought to the ED, her initial blood pressure was 90/60, however repeat was 80/40. The patient says that she had a fever over the past few days, however no other symptoms. No urinary changes, no diarrhea no constipation. The patient has no abdominal pain. The patient did not vomit. No history of seizures. Review of Systems   Constitutional:  Positive for fever. Negative for appetite change, chills and diaphoresis. HENT:  Negative for ear discharge, ear pain, facial swelling, sneezing and sore throat. Eyes:  Negative for photophobia and pain. Respiratory:  Positive for shortness of breath. Negative for cough and choking. Cardiovascular:  Positive for chest pain. Negative for palpitations. Gastrointestinal:  Positive for abdominal pain and nausea. Negative for constipation, diarrhea and vomiting. Genitourinary:  Negative for dysuria, enuresis, flank pain, frequency and hematuria.    Musculoskeletal: Negative for arthralgias, back pain, joint swelling, myalgias and neck pain. Skin:  Negative for pallor and rash. Neurological:  Positive for syncope and light-headedness. Negative for weakness and headaches. Physical Exam  Constitutional:       General: She is not in acute distress. Appearance: She is ill-appearing. HENT:      Head: Normocephalic and atraumatic. Nose: Nose normal. No congestion. Mouth/Throat:      Mouth: Mucous membranes are moist.      Pharynx: No posterior oropharyngeal erythema. Eyes:      General: Scleral icterus present. Extraocular Movements: Extraocular movements intact. Pupils: Pupils are equal, round, and reactive to light. Cardiovascular:      Rate and Rhythm: Normal rate and regular rhythm. Pulses: Normal pulses. Heart sounds: Normal heart sounds. Pulmonary:      Effort: Pulmonary effort is normal. No respiratory distress. Breath sounds: Normal breath sounds. No stridor. No wheezing or rhonchi. Chest:      Chest wall: No tenderness. Abdominal:      General: Bowel sounds are normal. There is no distension. Palpations: Abdomen is soft. There is no mass. Tenderness: There is abdominal tenderness. Genitourinary:     Rectum: Normal.      Comments: Stool impacted in rectum  Musculoskeletal:         General: No swelling, tenderness or deformity. Normal range of motion. Cervical back: Normal range of motion. No rigidity or tenderness. Skin:     Capillary Refill: Capillary refill takes less than 2 seconds. Coloration: Skin is jaundiced. Skin is not pale. Findings: No erythema. Neurological:      General: No focal deficit present. Mental Status: She is alert and oriented to person, place, and time. Mental status is at baseline.         Procedures       MDM  Number of Diagnoses or Management Options  Syncope and collapse  Urinary tract infection without hematuria, site unspecified  Diagnosis management comments: This is a 79-year-old female history of cirrhosis due to alcohol abuse for many years, chronic smoking history for about 35 years 1 pack a day presents the ED with complaints of syncope. The patient was smoking outside with her  in the backyard, and she stood up from smoking a cigarette to walk to the house, she had felt very lightheaded and nauseated, and then collapsed to the floor. When she woke up she felt like she had central chest pain and shortness of breath. As per , patient did not hit her head, however she is collapse for few seconds. EMS state that they arrived to the house, brought to the ED, her initial blood pressure was 90/60, however repeat was 80/40. The patient says that she had a fever over the past few days, however no other symptoms. No urinary changes, no diarrhea no constipation. The patient has no abdominal pain. The patient did not vomit. No history of seizures. The patient here in the ED is hypotensive with a blood pressure of 69/44, mildly bradycardic at 58. The patient has no hypoxia. She is alert and oriented. The patient has clear lungs auscultation, no abnormal heart murmurs are heard. The patient has generalized abdominal tenderness which is acute in onset according to the patient. The patient has peripheral clubbing. No signs of cyanosis. She has jaundice and scleral icterus. Patient's oromucosa is wet and moist.  Patient is placed on the monitor, IV in the left arm was placed, and she is on continuous pulse ox. Patient probably responded to fluids and her blood pressure went up to 90 systolic. Patient was given IV 0.9% saline 1 L. EKG is ordered to have documentation of patient's current rhythm, and to rule out any obvious acute cardiac illnesses such as ACS. Additionally, QT interval may be of use in decision making regarding any medications administered here in the ED.  Patient is placed on cardiac monitor and continuous pulse ox for monitoring. POCT glucose ordered to rule out acute hyperglycemia or hypoglycemia as a cause of symptoms. CBC is ordered to evaluate for any signs of infection or inflammation by obtaining a WBC count, or any signs of acute anemia by interpreting hemoglobin. A metabolic panel with electrolytes was ordered to evaluate for an electrolyte abnormalities and/or to evaluate for kidney function which may impact decision on types, doses of medications or imaging studies ordered here in the ER. Troponin ordered to evaluate for possible cardiac etiology of symptoms including but not limited to STEMI or NSTEMI. Urinalysis ordered to evaluate for UTI as the possible cause of symptoms, and may also evaluate hematuria as well. Lipase levels were ordered to evaluate for possible elevations which suggest pancreatic etiology of symptoms. Lactic acid levels were ordered to evaluate for signs of ischemia or decrease perfusion to organ systems. Viral swabs are ordered to evaluate possible viral etiology of symptoms. Hepatic function panel obtained to assess liver function to dose medications, and to exclude acute elevation in transaminases which may indicate hepatitis or other hepatic pathology. Due to patient's presentation, ammonia will also be ordered to evaluate for possible elevations which may possibly indicate hepatic encephalopathy. A CT abdomen with IV contrast was ordered to evaluate for, but without limitation, constipation, small bowel obstruction, bowel ischemia, pneumoperitoneum, diverticulitis, cholecystitis, appendicitis, perforation. CTA chest with contrast ordered to evaluate for, but without limitation to, pulmonary embolism, effusions, pneumonia, dissection or acute bony fractures. CT head and cervical spine is ordered to evaluate for findings including but not limited to hemorrhage, fractures, subluxation or displacement. Remainder of MDM will be the ED course below.        Amount and/or Complexity of Data Reviewed  Decide to obtain previous medical records or to obtain history from someone other than the patient: yes       ED Course as of 01/19/23 1715   Thu Jan 19, 2023   0126 Patient's anemia recorded at 10.6, this is a decrease from previous results. Hemoccult negative. Patient lipase elevated 78. Hepatic function panel reveals mild elevation in alkaline phosphatase and AST. Slight elevations in bilirubin. Potassium is low at 3.1, patient will be given IV potassium 10 mEq. [AH]   0127 Patient also given 40 mEq of potassium p.o. []   0144 EKG: This EKG is signed and interpreted by me. Rate: 58  Rhythm: Sinus  Interpretation: sinus bradycardia, normal axis, normal IA interval, normal QRS duration, no QT prolongation, no acute ST changes  Comparison: was normal []   0228 Patient's urinalysis reveals concerns for moderate UTI. Patient be given 1 g Rocephin IV. [AH]   0345 Patient ambulated well with pulse ox without any difficulty signs of hypoxia. Patient feels a lot better. Patient have saline and potassium running through her IV, and repeat troponin will be ordered at this time. []      ED Course User Index  [AH] Steffany Ardon MD       --------------------------------------------- PAST HISTORY ---------------------------------------------  Past Medical History:  has a past medical history of Cirrhosis ([facility] [address] Past Surgical History:  has a past surgical history that includes joint replacement (Left); Hip fracture surgery (Left, 5/22/2021); and Finger surgery (Right, 11/9/2021). Social History:  reports that she has been smoking. She has been smoking an average of .25 packs per day. She has never used smokeless tobacco. She reports that she does not currently use alcohol after a past usage of about 2.0 standard drinks per week. She reports current drug use. Drug: Marijuana Erika Aver). Family History: family history is not on file.      The patients home medications have been reviewed. Allergies: Patient has no known allergies.     -------------------------------------------------- RESULTS -------------------------------------------------  Labs:  Results for orders placed or performed during the hospital encounter of 01/19/23   COVID-19, Rapid    Specimen: Nasopharyngeal Swab   Result Value Ref Range    SARS-CoV-2, NAAT Not Detected Not Detected   RAPID INFLUENZA A/B ANTIGENS    Specimen: Nasopharyngeal   Result Value Ref Range    Influenza A by PCR Not Detected Not Detected    Influenza B by PCR Not Detected Not Detected   CBC with Auto Differential   Result Value Ref Range    WBC 3.4 (L) 4.5 - 11.5 E9/L    RBC 2.95 (L) 3.50 - 5.50 E12/L    Hemoglobin 10.6 (L) 11.5 - 15.5 g/dL    Hematocrit 31.8 (L) 34.0 - 48.0 %    .8 (H) 80.0 - 99.9 fL    MCH 35.9 (H) 26.0 - 35.0 pg    MCHC 33.3 32.0 - 34.5 %    RDW 15.3 (H) 11.5 - 15.0 fL    Platelets 50 (L) 136 - 450 E9/L    MPV 10.2 7.0 - 12.0 fL    Neutrophils % 67.7 43.0 - 80.0 %    Immature Granulocytes % 0.6 0.0 - 5.0 %    Lymphocytes % 16.6 (L) 20.0 - 42.0 %    Monocytes % 12.2 (H) 2.0 - 12.0 %    Eosinophils % 2.0 0.0 - 6.0 %    Basophils % 0.9 0.0 - 2.0 %    Neutrophils Absolute 2.32 1.80 - 7.30 E9/L    Immature Granulocytes # 0.02 E9/L    Lymphocytes Absolute 0.57 (L) 1.50 - 4.00 E9/L    Monocytes Absolute 0.42 0.10 - 0.95 E9/L    Eosinophils Absolute 0.07 0.05 - 0.50 E9/L    Basophils Absolute 0.03 0.00 - 0.20 E9/L    Anisocytosis 1+    BMP   Result Value Ref Range    Sodium 139 132 - 146 mmol/L    Potassium 3.1 (L) 3.5 - 5.0 mmol/L    Chloride 103 98 - 107 mmol/L    CO2 24 22 - 29 mmol/L    Anion Gap 12 7 - 16 mmol/L    Glucose 97 74 - 99 mg/dL    BUN 6 6 - 20 mg/dL    Creatinine 0.5 0.5 - 1.0 mg/dL    Est, Glom Filt Rate >60 >=60 mL/min/1.73    Calcium 8.7 8.6 - 10.2 mg/dL   Hepatic Function Panel   Result Value Ref Range    Total Protein 6.1 (L) 6.4 - 8.3 g/dL    Albumin 3.8 3.5 - 5.2 g/dL    Alkaline Phosphatase 164 (H) 35 - 104 U/L    ALT 32 0 - 32 U/L    AST 81 (H) 0 - 31 U/L    Total Bilirubin 1.5 (H) 0.0 - 1.2 mg/dL    Bilirubin, Direct 0.7 (H) 0.0 - 0.3 mg/dL    Bilirubin, Indirect 0.8 0.0 - 1.0 mg/dL   Ammonia   Result Value Ref Range    Ammonia 45.6 11.0 - 51.0 umol/L   Urinalysis   Result Value Ref Range    Color, UA Yellow Straw/Yellow    Clarity, UA Clear Clear    Glucose, Ur Negative Negative mg/dL    Bilirubin Urine Negative Negative    Ketones, Urine Negative Negative mg/dL    Specific Gravity, UA <=1.005 1.005 - 1.030    Blood, Urine Negative Negative    pH, UA 7.0 5.0 - 9.0    Protein, UA Negative Negative mg/dL    Urobilinogen, Urine 0.2 <2.0 E.U./dL    Nitrite, Urine POSITIVE (A) Negative    Leukocyte Esterase, Urine SMALL (A) Negative   Troponin   Result Value Ref Range    Troponin, High Sensitivity 6 0 - 9 ng/L   Magnesium   Result Value Ref Range    Magnesium 1.6 1.6 - 2.6 mg/dL   Lactic Acid   Result Value Ref Range    Lactic Acid 2.2 0.5 - 2.2 mmol/L   Lipase   Result Value Ref Range    Lipase 78 (H) 13 - 60 U/L   Platelet Confirmation   Result Value Ref Range    Platelet Confirmation CONFIRMED    Microscopic Urinalysis   Result Value Ref Range    WBC, UA 5-10 (A) 0 - 5 /HPF    RBC, UA NONE 0 - 2 /HPF    Epithelial Cells, UA RARE /HPF    Bacteria, UA MODERATE (A) None Seen /HPF   Troponin   Result Value Ref Range    Troponin, High Sensitivity 8 0 - 9 ng/L   EKG 12 Lead   Result Value Ref Range    Ventricular Rate 58 BPM    Atrial Rate 58 BPM    P-R Interval 156 ms    QRS Duration 102 ms    Q-T Interval 478 ms    QTc Calculation (Bazett) 469 ms    P Axis 41 degrees    R Axis 83 degrees    T Axis 47 degrees       Radiology:  CTA PULMONARY W CONTRAST   Final Result   No evidence of pulmonary embolism or acute pulmonary abnormality. CT ABDOMEN PELVIS W IV CONTRAST Additional Contrast? None   Final Result   No acute abdominopelvic abnormality. Cirrhosis with evidence of portal venous hypertension.   No ascites. Status   post TIPS. CT Head W/O Contrast   Final Result   No acute intracranial abnormality. CT CSpine W/O Contrast   Final Result   No acute abnormality of the cervical spine. Medications   0.9 % sodium chloride bolus (0 mLs IntraVENous Stopped 1/19/23 0234)   ondansetron (ZOFRAN) injection 4 mg (4 mg IntraVENous Given 1/19/23 0139)   potassium chloride 10 mEq/100 mL IVPB (Peripheral Line) (0 mEq IntraVENous Stopped 1/19/23 0400)   potassium chloride (KLOR-CON M) extended release tablet 40 mEq (40 mEq Oral Given 1/19/23 0252)   iopamidol (ISOVUE-370) 76 % injection 60 mL (60 mLs IntraVENous Given 1/19/23 0211)   sodium chloride flush 0.9 % injection 10 mL (10 mLs IntraVENous Given 1/19/23 0207)   cefTRIAXone (ROCEPHIN) 1,000 mg in sterile water 10 mL IV syringe (1,000 mg IntraVENous Given 1/19/23 0252)       ------------------------- NURSING NOTES AND VITALS REVIEWED ---------------------------  Date / Time Roomed:  1/19/2023 12:23 AM  ED Bed Assignment:  28/28    The nursing notes within the ED encounter and vital signs as below have been reviewed. BP (!) 127/97   Pulse 67   Temp 97.6 °F (36.4 °C) (Oral)   Resp 12   Ht 5' 3\" (1.6 m)   Wt 86 lb (39 kg)   SpO2 96%   BMI 15.23 kg/m²   Oxygen Saturation Interpretation: Normal      ------------------------------------------ PROGRESS NOTES ------------------------------------------  I have spoken with the patient and discussed todays results, in addition to providing specific details for the plan of care and counseling regarding the diagnosis and prognosis. Their questions are answered at this time and they are agreeable with the plan. I discussed at length with them reasons for immediate return here for re evaluation. They will followup with primary care by calling their office tomorrow.       --------------------------------- ADDITIONAL PROVIDER NOTES ---------------------------------  At this time the patient is without objective evidence of an acute process requiring hospitalization or inpatient management. They have remained hemodynamically stable throughout their entire ED visit and are stable for discharge with outpatient follow-up. The plan has been discussed in detail and they are aware of the specific conditions for emergent return, as well as the importance of follow-up. Discharge Medication List as of 1/19/2023  4:50 AM   cefdinir (OMNICEF) 300 MG capsule  Take 1 capsule by mouth 2 times daily for 10 days, Disp-20 capsule, R-0        Diagnosis:  1. Syncope and collapse    2. Urinary tract infection without hematuria, site unspecified        Disposition:  Patient's disposition: Discharge to home  Patient's condition is stable. Patient was seen and evaluated by myself and my attending Cathleen Brown DO. Assessment and Plan discussed with attending provider, please see attestation for final plan of care.      MD Gissel Wade MD  Resident  01/19/23 8949

## 2023-01-21 LAB
ORGANISM: ABNORMAL
URINE CULTURE, ROUTINE: ABNORMAL

## 2023-01-31 ENCOUNTER — TELEPHONE (OUTPATIENT)
Dept: AUDIOLOGY | Age: 53
End: 2023-01-31

## 2023-01-31 NOTE — TELEPHONE ENCOUNTER
Referral for Hearing Evaluation received. Called patient and spoke to her , however they do not wish to schedule at this time as patient is hospitalized. They requested a call back next week.      Electronically signed by Amina Culver on 1/31/23 at 1:50 PM EST

## 2023-02-24 ENCOUNTER — FOLLOWUP TELEPHONE ENCOUNTER (OUTPATIENT)
Dept: AUDIOLOGY | Age: 53
End: 2023-02-24

## 2023-02-24 NOTE — TELEPHONE ENCOUNTER
Called to schedule per referral for hearing test. Got vm . Left emssage to call back to schedule.  Stated calling from different office and to please call [phone]

## 2023-03-08 ENCOUNTER — TELEPHONE (OUTPATIENT)
Dept: AUDIOLOGY | Age: 53
End: 2023-03-08

## 2023-03-08 NOTE — TELEPHONE ENCOUNTER
Called and left message to call us back for scheduling per referral received .      3rd and final attempt

## 2023-10-26 ENCOUNTER — HOSPITAL ENCOUNTER (EMERGENCY)
Age: 53
Discharge: OTHER INSTITUTION | End: 2023-10-26

## 2023-10-26 DIAGNOSIS — Z53.21 PATIENT LEFT WITHOUT BEING SEEN: Primary | ICD-10-CM

## 2024-07-24 ENCOUNTER — TELEPHONE (OUTPATIENT)
Dept: CASE MANAGEMENT | Age: 54
End: 2024-07-24

## 2024-07-24 ENCOUNTER — HOSPITAL ENCOUNTER (OUTPATIENT)
Dept: INFUSION THERAPY | Age: 54
Discharge: HOME | End: 2024-07-24

## 2024-07-24 ENCOUNTER — OFFICE VISIT (OUTPATIENT)
Dept: ONCOLOGY | Age: 54
End: 2024-07-24
Payer: COMMERCIAL

## 2024-07-24 VITALS
BODY MASS INDEX: 17.79 KG/M2 | DIASTOLIC BLOOD PRESSURE: 82 MMHG | WEIGHT: 104.2 LBS | OXYGEN SATURATION: 95 % | SYSTOLIC BLOOD PRESSURE: 118 MMHG | TEMPERATURE: 96.9 F | HEIGHT: 64 IN | HEART RATE: 87 BPM

## 2024-07-24 DIAGNOSIS — C76.0 HEAD AND NECK CANCER (HCC): Primary | ICD-10-CM

## 2024-07-24 DIAGNOSIS — J02.9 SORE THROAT: ICD-10-CM

## 2024-07-24 DIAGNOSIS — K74.69 OTHER CIRRHOSIS OF LIVER (HCC): ICD-10-CM

## 2024-07-24 PROCEDURE — 99205 OFFICE O/P NEW HI 60 MIN: CPT | Performed by: INTERNAL MEDICINE

## 2024-07-24 RX ORDER — PANTOPRAZOLE SODIUM 20 MG/1
20 TABLET, DELAYED RELEASE ORAL DAILY
COMMUNITY

## 2024-07-24 RX ORDER — LORAZEPAM 0.5 MG/1
0.5 TABLET ORAL EVERY 6 HOURS PRN
COMMUNITY

## 2024-07-24 NOTE — PROGRESS NOTES
Joycealisha Uriostegui  7/24/2024  Ht Readings from Last 1 Encounters:   07/24/24 1.626 m (5' 4\")     Wt Readings from Last 10 Encounters:   07/24/24 47.3 kg (104 lb 3.2 oz)   01/19/23 39 kg (86 lb)   12/16/21 46.7 kg (103 lb)   11/18/21 46.7 kg (103 lb)   11/09/21 46.7 kg (103 lb)   11/08/21 46.7 kg (103 lb)   10/25/21 47.6 kg (105 lb)   08/12/21 49 kg (108 lb)   06/04/21 47.6 kg (105 lb)   05/21/21 45.4 kg (100 lb)     Body mass index is 17.89 kg/m².    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 Pt completed concurrent chemoradiation at OSH in June of 2022. She reports improved swallow as well as appropriate taste acuity. She does admit to some sensitivity to spicy foods, though this is manageable. Pt does report ongoing issues w/ xerostomia, does utilize Biotene for management. She is pleased to report wt gain since completion of treatment. Pt consumes 3 meals/day, drinks 2 milkshakes daily. Reviewed role of oncology dietitian in pt's care. She denies any nutrition needs at this time, appreciative of information. Encouraged pt to contact this clinician as needed. Will follow for POC.    Appetite: good  Nutritional Side Effects: xerostomia  Calculated Needs: 35-40 kcal/kg CBW =  kcal, 1.5-1.8 gm/kg CBW = 70-85 gm pro, 1 ml/kcal =  ml fluids  Malnutrition Status: Severe, improving  Nutrition Diagnosis: Severe malnutrition r/t H&N CA AEB severe fat and muscle wasting.    Recommendations: Pt to continue current eating pattern as tolerated in effort to continue weight gain    Bryanna Longo, MS, RD, LD

## 2024-07-24 NOTE — TELEPHONE ENCOUNTER
Met with patient and her  during her initial consultation with Dr. Harley Malin for her possible right tonsil recurrence per Dr. Edgar. Introduced myself and explained my role with patients receiving treatment at our center. Patient was friendly and receptive. Completed nursing assessment for the center including medication review and medical, social, and surgical histories. Patient was treated with concurrent chemo/RT in June of 2022 at [facility]. Her last PET was performed in March of 2023 which showed no evidence of disease. She had a follow up with her ENT Surgeon, Dr. Dawood Edgar, on 7/9/2024. He performed a scope which suggested possible recurrence. She does not wish to follow with [facility], so she was referred to us. Patient was ordered a PET scan to be done as soon as possible to confirm if there is metabolically active disease. Next steps will be pending PET scan. Discussed additional ancillary services available at the center. Dietitian meeting with patient today. Provided with PET scan instructions and central scheduling phone number. Provided patient with my contact information, office hours, and encouragement to call me with questions or concerns. Patient verbalizes understanding and appreciative of nurse navigator visit. Emotional support provided and greater than 30 minutes spent with patient. Nurse navigator will continue to follow. PATTI DanielN, RN, Oncology Nurse Navigator

## 2024-07-24 NOTE — PROGRESS NOTES
Joyce Uriostegui  1970 54 y.o.      Referring Physician: Dr. Edgar    PCP: Jayson Uriostegui MD    Vitals:    24 1311   BP: 118/82   Pulse: 87   Temp: 96.9 °F (36.1 °C)   SpO2: 95%        Wt Readings from Last 3 Encounters:   24 47.3 kg (104 lb 3.2 oz)   23 39 kg (86 lb)   21 46.7 kg (103 lb)        Body mass index is 17.89 kg/m².          Chief Complaint:   Chief Complaint   Patient presents with    New Patient          Cancer Staging   No matching staging information was found for the patient.    Prior Radiation Therapy? YES: Site Treated: Right tonsil          Facility: [facility]          Date: 2022    Concurrent Chemo/radiation? YES: Site Treated: Right Tonsil          Facility: [facility]          Date: 2022    Prior Chemotherapy? YES: Site Treated: Right tonsil          Facility: [facility]          Date: 2022    Prior Hormonal Therapy? NO    Head and Neck Cancer? No, patient does NOT have HN cancer.      LMP: 34    Age at first Menses: 12    : 0    Para: 0          Current Outpatient Medications:     pantoprazole (PROTONIX) 20 MG tablet, Take 1 tablet by mouth daily, Disp: , Rfl:     LORazepam (ATIVAN) 0.5 MG tablet, Take 1 tablet by mouth every 6 hours as needed for Anxiety. Max Daily Amount: 2 mg, Disp: , Rfl:        Past Medical History:   Diagnosis Date    Cancer (HCC)     Cirrhosis (HCC)        Past Surgical History:   Procedure Laterality Date    FINGER SURGERY Right 2021    RIGHT SMALL FINGER PROXIMAL PHALANX CLOSED FIXATION performed by Ok Corea MD at [facility] OR    HIP FRACTURE SURGERY Left 2021    LEFT HIP OPEN REDUCTION INTERNAL FIXATION performed by Ok Corea MD at [facility] OR    JOINT REPLACEMENT Left     shoulder       Family History   Problem Relation Age of Onset    Heart Attack Father     Breast Cancer Sister     Breast Cancer Sister        Social History     Socioeconomic History    Marital status:      Spouse name: Not on file    Number

## 2024-07-24 NOTE — PROGRESS NOTES
[facility] [place] [facility]  [address] [facility] [address]  Dept: [phone]  Loc: [phone]  Attending Consult Note      Reason for Visit:   Head and neck cancer.    Referring Physician: Self-referred.    PCP:  Jayson Uriostegui MD    History of Present Illness:     Mrs. 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 Edgar, was found to have fullness in the right posterior and lateral hypopharynx, with secretions.  She did have nausea with her treatment as well as 20 pounds weight loss.  She does have altered taste, but no significant odynophagia or dysphagia.  She is accompanied by her spouse.    Review of Systems;  CONSTITUTIONAL: No fever, chills.  Fair appetite and energy level.   ENMT: Eyes: No diplopia; Nose: No epistaxis. Mouth: Positive for sore throat.  RESPIRATORY: No hemoptysis, shortness of breath, cough.   CARDIOVASCULAR: No chest pain, palpitations.  GASTROINTESTINAL: No nausea/vomiting, abdominal pain, diarrhea/constipation.  GENITOURINARY: No dysuria, urinary frequency, hematuria.  NEURO: No syncope, presyncope, headache.  Remainder:  ROS NEGATIVE    Past Medical History:      Diagnosis Date    Cirrhosis (HCC) 2013     Patient Active Problem List   Diagnosis    Closed left hip fracture, initial encounter (HCC)    Closed intertrochanteric fracture of hip, left, initial encounter (HCC)    Cirrhosis (HCC)    Post-operative pain    Closed displaced fracture of proximal phalanx of right little finger        Past Surgical

## 2024-08-09 ENCOUNTER — HOSPITAL ENCOUNTER (OUTPATIENT)
Dept: PET IMAGING | Age: 54
End: 2024-08-09
Attending: INTERNAL MEDICINE
Payer: COMMERCIAL

## 2024-08-09 DIAGNOSIS — C76.0 HEAD AND NECK CANCER (HCC): ICD-10-CM

## 2024-08-09 DIAGNOSIS — J02.9 SORE THROAT: ICD-10-CM

## 2024-08-09 LAB — GLUCOSE BLD-MCNC: 85 MG/DL (ref 74–99)

## 2024-08-09 PROCEDURE — 78815 PET IMAGE W/CT SKULL-THIGH: CPT

## 2024-08-09 PROCEDURE — 82962 GLUCOSE BLOOD TEST: CPT

## 2024-08-09 PROCEDURE — 3430000000 HC RX DIAGNOSTIC RADIOPHARMACEUTICAL: Performed by: RADIOLOGY

## 2024-08-09 PROCEDURE — A9609 HC RX DIAGNOSTIC RADIOPHARMACEUTICAL: HCPCS | Performed by: RADIOLOGY

## 2024-08-09 RX ORDER — FLUDEOXYGLUCOSE F 18 200 MCI/ML
15 INJECTION, SOLUTION INTRAVENOUS
Status: COMPLETED | OUTPATIENT
Start: 2024-08-09 | End: 2024-08-09

## 2024-08-09 RX ADMIN — FLUDEOXYGLUCOSE F 18 15 MILLICURIE: 200 INJECTION, SOLUTION INTRAVENOUS at 10:00

## 2024-08-14 ENCOUNTER — SCHEDULED TELEPHONE ENCOUNTER (OUTPATIENT)
Dept: ONCOLOGY | Age: 54
End: 2024-08-14
Payer: COMMERCIAL

## 2024-08-14 DIAGNOSIS — K74.69 OTHER CIRRHOSIS OF LIVER (HCC): ICD-10-CM

## 2024-08-14 DIAGNOSIS — C76.0 HEAD AND NECK CANCER (HCC): ICD-10-CM

## 2024-08-14 DIAGNOSIS — R91.8 LUNG NODULES: Primary | ICD-10-CM

## 2024-08-14 PROCEDURE — 99442 PR PHYS/QHP TELEPHONE EVALUATION 11-20 MIN: CPT | Performed by: INTERNAL MEDICINE

## 2024-08-14 NOTE — PROGRESS NOTES
[facility] [place] [facility]  [address] [address]  Dept: [phone]  Loc: [phone]  Attending progress note    Total Time: minutes: 11-20 minutes     Joyce Uriostegui was evaluated through a synchronous (real-time) audio encounter. Patient identification was verified at the start of the visit. She (or guardian if applicable) is aware that this is a billable service, which includes applicable co-pays. This visit was conducted with the patient's (and/or legal guardian's) verbal consent. She has not had a related appointment within my department in the past 7 days or scheduled within the next 24 hours.   The patient was located at Home: [address] [place] [address].  The provider was located at Facility (Appt Dept): [address] [place] [address].    Note: not billable if this call serves to triage the patient into an appointment for the relevant concern  Yes, I confirm.   Joyce Uriostegui is a 54 y.o. female evaluated via telephone on 8/14/2024     Reason for Visit:   Head and neck cancer.    Referring Physician: Self-referred.    PCP:  Olegario Conti DO    History of Present Illness:     Mrs. 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 Edgar, was found to have fullness in the right posterior and lateral hypopharynx, with secretions.  She did have nausea with her treatment as well as 20 pounds weight loss.  She does have altered taste, but no significant odynophagia or dysphagia.  The patient has not had any

## 2024-08-29 ENCOUNTER — TELEPHONE (OUTPATIENT)
Dept: CASE MANAGEMENT | Age: 54
End: 2024-08-29

## 2024-08-29 NOTE — TELEPHONE ENCOUNTER
Patient was seen in office by Dr. Edgar. Office note requested. Patient had a CT scan performed and results are available in [facility]. Dr. Edgar will review the results and schedule patient accordingly. No follow ups scheduled at this time. Patient is scheduled to see pulmonary on October 8th. [facility] with office staff requesting that patient be seen sooner. Will follow up on future appointments with Dr. Edgar and Dr. Simmons.

## 2024-09-18 ENCOUNTER — OFFICE VISIT (OUTPATIENT)
Dept: ONCOLOGY | Age: 54
End: 2024-09-18
Payer: COMMERCIAL

## 2024-09-18 ENCOUNTER — HOSPITAL ENCOUNTER (OUTPATIENT)
Dept: INFUSION THERAPY | Age: 54
Discharge: HOME | End: 2024-09-18

## 2024-09-18 VITALS
DIASTOLIC BLOOD PRESSURE: 90 MMHG | OXYGEN SATURATION: 97 % | HEART RATE: 82 BPM | BODY MASS INDEX: 16.73 KG/M2 | SYSTOLIC BLOOD PRESSURE: 138 MMHG | HEIGHT: 64 IN | WEIGHT: 98 LBS | TEMPERATURE: 98 F

## 2024-09-18 DIAGNOSIS — K74.69 OTHER CIRRHOSIS OF LIVER (HCC): ICD-10-CM

## 2024-09-18 DIAGNOSIS — R91.8 LUNG NODULES: Primary | ICD-10-CM

## 2024-09-18 DIAGNOSIS — C76.0 HEAD AND NECK CANCER (HCC): ICD-10-CM

## 2024-09-18 PROCEDURE — 99214 OFFICE O/P EST MOD 30 MIN: CPT | Performed by: INTERNAL MEDICINE

## 2024-09-18 RX ORDER — ONDANSETRON 4 MG/1
4 TABLET, FILM COATED ORAL EVERY 8 HOURS PRN
COMMUNITY

## 2024-10-08 ENCOUNTER — OFFICE VISIT (OUTPATIENT)
Dept: PULMONOLOGY | Age: 54
End: 2024-10-08
Payer: COMMERCIAL

## 2024-10-08 VITALS
OXYGEN SATURATION: 91 % | DIASTOLIC BLOOD PRESSURE: 74 MMHG | RESPIRATION RATE: 12 BRPM | HEART RATE: 77 BPM | SYSTOLIC BLOOD PRESSURE: 140 MMHG

## 2024-10-08 DIAGNOSIS — C09.9 PRIMARY TONSILLAR SQUAMOUS CELL CARCINOMA (HCC): ICD-10-CM

## 2024-10-08 DIAGNOSIS — K70.30 ALCOHOLIC CIRRHOSIS OF LIVER WITHOUT ASCITES (HCC): ICD-10-CM

## 2024-10-08 DIAGNOSIS — R91.8 LUNG NODULES: Primary | ICD-10-CM

## 2024-10-08 PROCEDURE — 99204 OFFICE O/P NEW MOD 45 MIN: CPT | Performed by: INTERNAL MEDICINE

## 2024-10-08 NOTE — PROGRESS NOTES
MD Anmol at [facility] OR    JOINT REPLACEMENT Left     shoulder       FAMILY HISTORY:   Family History   Problem Relation Age of Onset    Heart Attack Father     Breast Cancer Sister     Breast Cancer Sister        SOCIAL HISTORY:   Social History     Socioeconomic History    Marital status:      Spouse name: Not on file    Number of children: Not on file    Years of education: Not on file    Highest education level: Not on file   Occupational History    Not on file   Tobacco Use    Smoking status: Former     Types: Cigarettes    Smokeless tobacco: Never   Vaping Use    Vaping status: Never Used   Substance and Sexual Activity    Alcohol use: Not Currently     Alcohol/week: 2.0 standard drinks of alcohol     Types: 2 Glasses of wine per week     Comment:  states she stopped a few days ago, prior 2-4 glasses wine/week    Drug use: Yes     Types: Marijuana (Weed)     Comment: 2x per month    Sexual activity: Not on file   Other Topics Concern    Not on file   Social History Narrative    Not on file     Social Determinants of Health     Financial Resource Strain: Not on file   Food Insecurity: Not on file   Transportation Needs: Not on file   Physical Activity: Not on file   Stress: Not on file   Social Connections: Not on file   Intimate Partner Violence: Not on file   Housing Stability: Not on file     Social History     Tobacco Use   Smoking Status Former    Types: Cigarettes   Smokeless Tobacco Never     Social History     Substance and Sexual Activity   Alcohol Use Not Currently    Alcohol/week: 2.0 standard drinks of alcohol    Types: 2 Glasses of wine per week    Comment:  states she stopped a few days ago, prior 2-4 glasses wine/week     Social History     Substance and Sexual Activity   Drug Use Yes    Types: Marijuana (Weed)    Comment: 2x per month       HOME MEDICATIONS:  Prior to Admission medications    Medication Sig Start Date End Date Taking? Authorizing Provider   ondansetron (ZOFRAN) 4 MG tablet

## 2024-10-14 ENCOUNTER — TELEPHONE (OUTPATIENT)
Dept: PULMONOLOGY | Age: 54
End: 2024-10-14

## 2024-10-14 NOTE — TELEPHONE ENCOUNTER
Mailed letter to patient to inform her of the CT of the Chest that is scheduled for her at [address] [facility] . This test is scheduled on  Wednesday, January 8, 2025 at  8:00 am. Please arrive 30 minutes prior to appointment time. No Test Prep is needed

## 2024-12-12 DIAGNOSIS — R91.8 LUNG NODULES: Primary | ICD-10-CM

## 2025-02-07 ENCOUNTER — TELEPHONE (OUTPATIENT)
Dept: PULMONOLOGY | Age: 55
End: 2025-02-07

## 2025-02-07 DIAGNOSIS — R91.8 LUNG NODULES: Primary | ICD-10-CM

## 2025-02-07 NOTE — TELEPHONE ENCOUNTER
Mailed letter to patient to inform her of the CT of the Chest that is scheduled for her at [facility] . This test is scheduled on  Thursday, March 6, 2025 at  2:30 pm. Please arrive 15 minutes prior to appointment time. No Test Prep is needed

## 2025-02-07 NOTE — TELEPHONE ENCOUNTER
Mailed letter to patient to inform her of the PFT that is scheduled for her at [address] [facility] . This test is scheduled on  Thursday, March 20, 2025 at  2:00 pm. Please arrive 15 minutes prior to appointment time. The prep for this test is to not have any caffeine for 24 hours prior to testing and no respiratory medications for at least 4 hours prior to testing time.

## 2025-03-05 ENCOUNTER — TELEPHONE (OUTPATIENT)
Dept: PULMONOLOGY | Age: 55
End: 2025-03-05

## 2025-03-05 NOTE — TELEPHONE ENCOUNTER
Letter sent to pt to inform of appointment change w/Dr. Simmons due to the most recent ICU/Consults schedule.

## 2025-04-08 ENCOUNTER — TELEPHONE (OUTPATIENT)
Dept: PULMONOLOGY | Age: 55
End: 2025-04-08

## 2025-04-08 NOTE — TELEPHONE ENCOUNTER
Call returned to pt advising that order for PFT has been extended thru July,  reports pt is in bed and has not been feeling well. Advised that she can reschedule testing before July 1st and the order is good thru that date. Also advised that I have given pt a follow up appt with Dr Simmons for 6/13/25 at 130pm so she has an follow up after the testing. Dr Simmons' schedule is full and I don't want her to not have follow up on the books. HE took down appt info and will given to pt. Also mailing appt card. Elroy verbalized understanding and will have pt call to schedule testing before June appt.

## 2025-07-31 ENCOUNTER — APPOINTMENT (OUTPATIENT)
Dept: CT IMAGING | Age: 55
End: 2025-07-31
Payer: COMMERCIAL

## 2025-07-31 ENCOUNTER — HOSPITAL ENCOUNTER (EMERGENCY)
Age: 55
Discharge: HOME | End: 2025-07-31
Payer: COMMERCIAL

## 2025-07-31 VITALS
HEIGHT: 64 IN | RESPIRATION RATE: 18 BRPM | WEIGHT: 99 LBS | DIASTOLIC BLOOD PRESSURE: 81 MMHG | BODY MASS INDEX: 16.9 KG/M2 | TEMPERATURE: 98.1 F | OXYGEN SATURATION: 90 % | SYSTOLIC BLOOD PRESSURE: 123 MMHG | HEART RATE: 86 BPM

## 2025-07-31 DIAGNOSIS — S09.90XA CLOSED HEAD INJURY, INITIAL ENCOUNTER: ICD-10-CM
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
## 2025-07-31 DIAGNOSIS — S01.01XA LACERATION OF SCALP, INITIAL ENCOUNTER: Primary | ICD-10-CM

## 2025-07-31 PROCEDURE — 70450 CT HEAD/BRAIN W/O DYE: CPT

## 2025-07-31 PROCEDURE — 12002 RPR S/N/AX/GEN/TRNK2.6-7.5CM: CPT

## 2025-07-31 PROCEDURE — 99284 EMERGENCY DEPT VISIT MOD MDM: CPT

## 2025-07-31 PROCEDURE — 6360000002 HC RX W HCPCS: Performed by: PHYSICIAN ASSISTANT

## 2025-07-31 PROCEDURE — 13121 CMPLX RPR S/A/L 2.6-7.5 CM: CPT

## 2025-07-31 PROCEDURE — 6370000000 HC RX 637 (ALT 250 FOR IP): Performed by: PHYSICIAN ASSISTANT

## 2025-07-31 PROCEDURE — 72125 CT NECK SPINE W/O DYE: CPT

## 2025-07-31 RX ORDER — LIDOCAINE HYDROCHLORIDE 10 MG/ML
20 INJECTION, SOLUTION INFILTRATION; PERINEURAL ONCE
Status: COMPLETED | OUTPATIENT
Start: 2025-07-31 | End: 2025-07-31

## 2025-07-31 RX ORDER — HYDROCODONE BITARTRATE AND ACETAMINOPHEN 5; 325 MG/1; MG/1
1 TABLET ORAL ONCE
Status: COMPLETED | OUTPATIENT
Start: 2025-07-31 | End: 2025-07-31

## 2025-07-31 RX ORDER — ONDANSETRON 4 MG/1
4 TABLET, ORALLY DISINTEGRATING ORAL ONCE
Status: COMPLETED | OUTPATIENT
Start: 2025-07-31 | End: 2025-07-31

## 2025-07-31 RX ORDER — ACETAMINOPHEN 500 MG
1000 TABLET ORAL ONCE
Status: COMPLETED | OUTPATIENT
Start: 2025-07-31 | End: 2025-07-31

## 2025-07-31 RX ORDER — OXYCODONE AND ACETAMINOPHEN 5; 325 MG/1; MG/1
1 TABLET ORAL ONCE
Refills: 0 | Status: DISCONTINUED | OUTPATIENT
Start: 2025-07-31 | End: 2025-07-31

## 2025-07-31 RX ADMIN — HYDROCODONE BITARTRATE AND ACETAMINOPHEN 1 TABLET: 5; 325 TABLET ORAL at 19:25

## 2025-07-31 RX ADMIN — ACETAMINOPHEN 1000 MG: 500 TABLET ORAL at 22:12

## 2025-07-31 RX ADMIN — ONDANSETRON 4 MG: 4 TABLET, ORALLY DISINTEGRATING ORAL at 22:01

## 2025-07-31 RX ADMIN — LIDOCAINE HYDROCHLORIDE 20 ML: 10 INJECTION, SOLUTION INFILTRATION; PERINEURAL at 19:26

## 2025-07-31 ASSESSMENT — PAIN DESCRIPTION - DESCRIPTORS: DESCRIPTORS: ACHING

## 2025-07-31 ASSESSMENT — PAIN SCALES - GENERAL: PAINLEVEL_OUTOF10: 10

## 2025-07-31 ASSESSMENT — PAIN DESCRIPTION - LOCATION: LOCATION: HEAD

## 2025-07-31 NOTE — ED PROVIDER NOTES
Independent DONALD Visit.     Department of Emergency Medicine   ED  Provider Note  Admit Date/RoomTime: 7/31/2025  7:15 PM  ED Room: LEONARDO/LEONARDO    Chief Complaint   Head Injury (Head injury. Patient fell and hit head on hard wood floor. Unsure if LOC. Denies thinners.) and Fall    History of Present Illness      Joyce Uriostegui is a 55 y.o. old female who presents to the ED for head injury.  Patient states she tripped at home and fell.  She did hit her head on a hardwood floor.  She is unsure if she had any loss of consciousness but states she does not believe so.  She does not take anticoagulation.  Patient is accompanied by her .  He states patient is acting at baseline mental status for her.  Patient reports headache and neck pain.  She denies back pain, arm pain, leg pain, chest pain, shortness of breath, pain with breathing, abdominal pain, nausea, vomiting, or confusion.  Patient is alert and oriented x 3 and in no apparent distress at this exam.  She is nontoxic-appearing.    PCP: Olegario Conti DO ROS   Pertinent positives and negatives are stated within HPI, all other systems reviewed and are negative.    Past Medical History:  has a past medical history of Cancer (HCC) and Cirrhosis (HCC).    Past Surgical History:  has a past surgical history that includes joint replacement (Left); Hip fracture surgery (Left, 5/22/2021); and Finger surgery (Right, 11/9/2021).    Social History:  reports that she has quit smoking. Her smoking use included cigarettes. She has never used smokeless tobacco. She reports that she does not currently use alcohol after a past usage of about 2.0 standard drinks of alcohol per week. She reports current drug use. Drug: Marijuana (Weed).    Family History: family history includes Breast Cancer in her sister and sister; Heart Attack in her father.     The patient’s home medications have been reviewed.    Allergies: Patient has no known allergies.  Allergies have been reviewed

## 2025-08-04 ENCOUNTER — HOSPITAL ENCOUNTER (EMERGENCY)
Age: 55
Discharge: HOME | End: 2025-08-04
Payer: COMMERCIAL

## 2025-08-04 ENCOUNTER — APPOINTMENT (OUTPATIENT)
Dept: CT IMAGING | Age: 55
End: 2025-08-04
Payer: COMMERCIAL

## 2025-08-04 VITALS
BODY MASS INDEX: 16.9 KG/M2 | OXYGEN SATURATION: 95 % | WEIGHT: 99 LBS | RESPIRATION RATE: 18 BRPM | DIASTOLIC BLOOD PRESSURE: 71 MMHG | TEMPERATURE: 99.5 F | SYSTOLIC BLOOD PRESSURE: 119 MMHG | HEIGHT: 64 IN | HEART RATE: 93 BPM

## 2025-08-04 DIAGNOSIS — S09.90XD CLOSED HEAD INJURY, SUBSEQUENT ENCOUNTER: ICD-10-CM

## 2025-08-04 DIAGNOSIS — G43.009 MIGRAINE WITHOUT AURA AND WITHOUT STATUS MIGRAINOSUS, NOT INTRACTABLE: Primary | ICD-10-CM

## 2025-08-04 DIAGNOSIS — S06.0X0D CONCUSSION WITHOUT LOSS OF CONSCIOUSNESS, SUBSEQUENT ENCOUNTER: ICD-10-CM

## 2025-08-04 LAB
ALBUMIN SERPL-MCNC: 3.4 G/DL (ref 3.5–5.2)
ALP SERPL-CCNC: 115 U/L (ref 35–104)
ALT SERPL-CCNC: 17 U/L (ref 0–35)
ANION GAP SERPL CALCULATED.3IONS-SCNC: 12 MMOL/L (ref 7–16)
AST SERPL-CCNC: 40 U/L (ref 0–35)
BASOPHILS # BLD: 0.02 K/UL (ref 0–0.2)
BASOPHILS NFR BLD: 0 % (ref 0–2)
BILIRUB SERPL-MCNC: 2.5 MG/DL (ref 0–1.2)
BUN SERPL-MCNC: 6 MG/DL (ref 6–20)
CALCIUM SERPL-MCNC: 8.5 MG/DL (ref 8.6–10)
CHLORIDE SERPL-SCNC: 112 MMOL/L (ref 98–107)
CO2 SERPL-SCNC: 17 MMOL/L (ref 22–29)
CREAT SERPL-MCNC: 0.5 MG/DL (ref 0.5–1)
EOSINOPHIL # BLD: 0.05 K/UL (ref 0.05–0.5)
EOSINOPHILS RELATIVE PERCENT: 1 % (ref 0–6)
ERYTHROCYTE [DISTWIDTH] IN BLOOD BY AUTOMATED COUNT: 22.5 % (ref 11.5–15)
GFR, ESTIMATED: >90 ML/MIN/1.73M2
GLUCOSE SERPL-MCNC: 93 MG/DL (ref 74–99)
HCT VFR BLD AUTO: 36.5 % (ref 34–48)
HGB BLD-MCNC: 10.9 G/DL (ref 11.5–15.5)
IMM GRANULOCYTES # BLD AUTO: <0.03 K/UL (ref 0–0.58)
IMM GRANULOCYTES NFR BLD: 0 % (ref 0–5)
LACTATE BLDV-SCNC: 1.3 MMOL/L (ref 0.5–2.2)
LYMPHOCYTES NFR BLD: 0.65 K/UL (ref 1.5–4)
LYMPHOCYTES RELATIVE PERCENT: 11 % (ref 20–42)
MCH RBC QN AUTO: 25.5 PG (ref 26–35)
MCHC RBC AUTO-ENTMCNC: 29.9 G/DL (ref 32–34.5)
MCV RBC AUTO: 85.5 FL (ref 80–99.9)
MONOCYTES NFR BLD: 0.4 K/UL (ref 0.1–0.95)
MONOCYTES NFR BLD: 7 % (ref 2–12)
NEUTROPHILS NFR BLD: 81 % (ref 43–80)
NEUTS SEG NFR BLD: 4.91 K/UL (ref 1.8–7.3)
PLATELET # BLD AUTO: 116 K/UL (ref 130–450)
PMV BLD AUTO: 10.2 FL (ref 7–12)
POTASSIUM SERPL-SCNC: 3.5 MMOL/L (ref 3.5–5.1)
PROT SERPL-MCNC: 6.6 G/DL (ref 6.4–8.3)
RBC # BLD AUTO: 4.27 M/UL (ref 3.5–5.5)
RBC # BLD: ABNORMAL 10*6/UL
SODIUM SERPL-SCNC: 142 MMOL/L (ref 136–145)
WBC OTHER # BLD: 6.1 K/UL (ref 4.5–11.5)

## 2025-08-04 PROCEDURE — 99284 EMERGENCY DEPT VISIT MOD MDM: CPT

## 2025-08-04 PROCEDURE — 6370000000 HC RX 637 (ALT 250 FOR IP): Performed by: PHYSICIAN ASSISTANT

## 2025-08-04 PROCEDURE — 2580000003 HC RX 258: Performed by: PHYSICIAN ASSISTANT

## 2025-08-04 PROCEDURE — 83605 ASSAY OF LACTIC ACID: CPT

## 2025-08-04 PROCEDURE — 85025 COMPLETE CBC W/AUTO DIFF WBC: CPT

## 2025-08-04 PROCEDURE — 80053 COMPREHEN METABOLIC PANEL: CPT

## 2025-08-04 PROCEDURE — 70450 CT HEAD/BRAIN W/O DYE: CPT

## 2025-08-04 RX ORDER — 0.9 % SODIUM CHLORIDE 0.9 %
500 INTRAVENOUS SOLUTION INTRAVENOUS ONCE
Status: COMPLETED | OUTPATIENT
Start: 2025-08-04 | End: 2025-08-04

## 2025-08-04 RX ORDER — MORPHINE SULFATE 4 MG/ML
4 INJECTION, SOLUTION INTRAMUSCULAR; INTRAVENOUS ONCE
Status: DISCONTINUED | OUTPATIENT
Start: 2025-08-04 | End: 2025-08-04

## 2025-08-04 RX ORDER — HYDROCODONE BITARTRATE AND ACETAMINOPHEN 5; 325 MG/1; MG/1
1 TABLET ORAL ONCE
Status: COMPLETED | OUTPATIENT
Start: 2025-08-04 | End: 2025-08-04

## 2025-08-04 RX ADMIN — HYDROCODONE BITARTRATE AND ACETAMINOPHEN 1 TABLET: 5; 325 TABLET ORAL at 19:31

## 2025-08-04 RX ADMIN — SODIUM CHLORIDE 500 ML: 0.9 INJECTION, SOLUTION INTRAVENOUS at 18:07

## 2025-08-04 ASSESSMENT — LIFESTYLE VARIABLES
HOW OFTEN DO YOU HAVE A DRINK CONTAINING ALCOHOL: NEVER
HOW MANY STANDARD DRINKS CONTAINING ALCOHOL DO YOU HAVE ON A TYPICAL DAY: PATIENT DOES NOT DRINK

## 2025-08-04 ASSESSMENT — PAIN DESCRIPTION - DESCRIPTORS: DESCRIPTORS: THROBBING;ACHING

## 2025-08-04 ASSESSMENT — PAIN SCALES - GENERAL
PAINLEVEL_OUTOF10: 9
PAINLEVEL_OUTOF10: 9

## 2025-08-04 ASSESSMENT — PAIN DESCRIPTION - LOCATION
LOCATION: HEAD;LEG
LOCATION: HEAD

## 2025-08-04 ASSESSMENT — PAIN DESCRIPTION - ORIENTATION: ORIENTATION: RIGHT;LEFT

## 2025-08-04 ASSESSMENT — PAIN - FUNCTIONAL ASSESSMENT: PAIN_FUNCTIONAL_ASSESSMENT: 0-10

## 2025-08-21 ENCOUNTER — HOSPITAL ENCOUNTER (EMERGENCY)
Age: 55
Discharge: HOME | End: 2025-08-22
Attending: EMERGENCY MEDICINE
Payer: COMMERCIAL

## 2025-08-21 ENCOUNTER — APPOINTMENT (OUTPATIENT)
Dept: GENERAL RADIOLOGY | Age: 55
End: 2025-08-21
Payer: COMMERCIAL

## 2025-08-21 ENCOUNTER — APPOINTMENT (OUTPATIENT)
Dept: CT IMAGING | Age: 55
End: 2025-08-21
Payer: COMMERCIAL

## 2025-08-21 VITALS
HEART RATE: 70 BPM | DIASTOLIC BLOOD PRESSURE: 77 MMHG | RESPIRATION RATE: 20 BRPM | SYSTOLIC BLOOD PRESSURE: 119 MMHG | OXYGEN SATURATION: 96 % | TEMPERATURE: 97.3 F
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
## 2025-08-21 DIAGNOSIS — S01.01XA LACERATION OF SCALP, INITIAL ENCOUNTER: ICD-10-CM

## 2025-08-21 DIAGNOSIS — M54.50 LOW BACK PAIN, UNSPECIFIED BACK PAIN LATERALITY, UNSPECIFIED CHRONICITY, UNSPECIFIED WHETHER SCIATICA PRESENT: ICD-10-CM
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
## 2025-08-21 LAB
ABO + RH BLD: NORMAL
ALBUMIN SERPL-MCNC: 3.4 G/DL (ref 3.5–5.2)
ALP SERPL-CCNC: 110 U/L (ref 35–104)
ALT SERPL-CCNC: 16 U/L (ref 0–35)
AMPHET UR QL SCN: NEGATIVE
ANION GAP SERPL CALCULATED.3IONS-SCNC: 14 MMOL/L (ref 7–16)
APAP SERPL-MCNC: <5 UG/ML (ref 10–30)
ARM BAND NUMBER: NORMAL
AST SERPL-CCNC: 41 U/L (ref 0–35)
B.E.: -5.8 MMOL/L (ref -3–3)
BARBITURATES UR QL SCN: NEGATIVE
BENZODIAZ UR QL: NEGATIVE
BILIRUB SERPL-MCNC: 1.4 MG/DL (ref 0–1.2)
BLOOD BANK SAMPLE EXPIRATION: NORMAL
BLOOD GROUP ANTIBODIES SERPL: NEGATIVE
BUN SERPL-MCNC: 7 MG/DL (ref 6–20)
BUPRENORPHINE UR QL: NEGATIVE
CALCIUM SERPL-MCNC: 8.5 MG/DL (ref 8.6–10)
CANNABINOIDS UR QL SCN: POSITIVE
CHLORIDE SERPL-SCNC: 111 MMOL/L (ref 98–107)
CO2 SERPL-SCNC: 18 MMOL/L (ref 22–29)
COCAINE UR QL SCN: NEGATIVE
COHB: 1.8 % (ref 0–1.5)
CREAT SERPL-MCNC: 0.6 MG/DL (ref 0.5–1)
CRITICAL: ABNORMAL
DATE ANALYZED: ABNORMAL
DATE OF COLLECTION: ABNORMAL
ERYTHROCYTE [DISTWIDTH] IN BLOOD BY AUTOMATED COUNT: 23.5 % (ref 11.5–15)
ETHANOLAMINE SERPL-MCNC: 217 MG/DL (ref 0–0.08)
FENTANYL UR QL: NEGATIVE
GFR, ESTIMATED: >90 ML/MIN/1.73M2
GLUCOSE SERPL-MCNC: 96 MG/DL (ref 74–99)
HCO3: 17.5 MMOL/L (ref 22–26)
HCT VFR BLD AUTO: 33.3 % (ref 34–48)
HGB BLD-MCNC: 10.4 G/DL (ref 11.5–15.5)
HHB: 0.4 % (ref 0–5)
INR PPP: 1.7
LAB: ABNORMAL
LACTATE BLDV-SCNC: 2.5 MMOL/L (ref 0.5–2.2)
LACTATE BLDV-SCNC: 3.6 MMOL/L (ref 0.5–2.2)
Lab: 1522
MCH RBC QN AUTO: 26.7 PG (ref 26–35)
MCHC RBC AUTO-ENTMCNC: 31.2 G/DL (ref 32–34.5)
MCV RBC AUTO: 85.6 FL (ref 80–99.9)
METHADONE UR QL: NEGATIVE
METHB: 0.2 % (ref 0–1.5)
MODE: ABNORMAL
O2 SATURATION: 99.6 % (ref 92–98.5)
O2HB: 97.6 % (ref 94–97)
OPERATOR ID: 5100
OPIATES UR QL SCN: NEGATIVE
OXYCODONE UR QL SCN: NEGATIVE
PARTIAL THROMBOPLASTIN TIME: 43.1 SEC (ref 24.5–35.1)
PATIENT TEMP: 37 C
PCO2: 28 MMHG (ref 35–45)
PCP UR QL SCN: NEGATIVE
PH BLOOD GAS: 7.41 (ref 7.35–7.45)
PLATELET # BLD AUTO: 96 K/UL (ref 130–450)
PLATELET CONFIRMATION: NORMAL
PMV BLD AUTO: 10.2 FL (ref 7–12)
PO2: 306.6 MMHG (ref 75–100)
POTASSIUM SERPL-SCNC: 3.3 MMOL/L (ref 3.5–5.1)
POTASSIUM SERPL-SCNC: 3.36 MMOL/L (ref 3.5–5)
PROT SERPL-MCNC: 6.2 G/DL (ref 6.4–8.3)
PROTHROMBIN TIME: 18.8 SEC (ref 9.3–12.4)
RBC # BLD AUTO: 3.89 M/UL (ref 3.5–5.5)
SALICYLATES SERPL-MCNC: <0.5 MG/DL (ref 0–30)
SODIUM SERPL-SCNC: 143 MMOL/L (ref 136–145)
SOURCE, BLOOD GAS: ABNORMAL
TEST INFORMATION: ABNORMAL
THB: 11.8 G/DL (ref 11.5–15.5)
TIME ANALYZED: 1523
TOXIC TRICYCLIC SC,BLOOD: NEGATIVE
WBC OTHER # BLD: 5.4 K/UL (ref 4.5–11.5)

## 2025-08-21 PROCEDURE — 85027 COMPLETE CBC AUTOMATED: CPT

## 2025-08-21 PROCEDURE — 6810039001 HC L1 TRAUMA PRIORITY

## 2025-08-21 PROCEDURE — 72170 X-RAY EXAM OF PELVIS: CPT

## 2025-08-21 PROCEDURE — 80307 DRUG TEST PRSMV CHEM ANLYZR: CPT

## 2025-08-21 PROCEDURE — 99285 EMERGENCY DEPT VISIT HI MDM: CPT

## 2025-08-21 PROCEDURE — 72128 CT CHEST SPINE W/O DYE: CPT

## 2025-08-21 PROCEDURE — 80053 COMPREHEN METABOLIC PANEL: CPT

## 2025-08-21 PROCEDURE — G0480 DRUG TEST DEF 1-7 CLASSES: HCPCS

## 2025-08-21 PROCEDURE — 6370000000 HC RX 637 (ALT 250 FOR IP)

## 2025-08-21 PROCEDURE — 82805 BLOOD GASES W/O2 SATURATION: CPT

## 2025-08-21 PROCEDURE — 72131 CT LUMBAR SPINE W/O DYE: CPT

## 2025-08-21 PROCEDURE — 6360000004 HC RX CONTRAST MEDICATION: Performed by: RADIOLOGY

## 2025-08-21 PROCEDURE — 83605 ASSAY OF LACTIC ACID: CPT

## 2025-08-21 PROCEDURE — 86900 BLOOD TYPING SEROLOGIC ABO: CPT

## 2025-08-21 PROCEDURE — 74177 CT ABD & PELVIS W/CONTRAST: CPT

## 2025-08-21 PROCEDURE — 84132 ASSAY OF SERUM POTASSIUM: CPT

## 2025-08-21 PROCEDURE — 71045 X-RAY EXAM CHEST 1 VIEW: CPT

## 2025-08-21 PROCEDURE — 80179 DRUG ASSAY SALICYLATE: CPT

## 2025-08-21 PROCEDURE — 72125 CT NECK SPINE W/O DYE: CPT

## 2025-08-21 PROCEDURE — 86901 BLOOD TYPING SEROLOGIC RH(D): CPT

## 2025-08-21 PROCEDURE — 86850 RBC ANTIBODY SCREEN: CPT

## 2025-08-21 PROCEDURE — 99284 EMERGENCY DEPT VISIT MOD MDM: CPT | Performed by: SURGERY

## 2025-08-21 PROCEDURE — 6360000002 HC RX W HCPCS

## 2025-08-21 PROCEDURE — 70450 CT HEAD/BRAIN W/O DYE: CPT

## 2025-08-21 PROCEDURE — 12002 RPR S/N/AX/GEN/TRNK2.6-7.5CM: CPT

## 2025-08-21 PROCEDURE — 71260 CT THORAX DX C+: CPT

## 2025-08-21 PROCEDURE — 85610 PROTHROMBIN TIME: CPT

## 2025-08-21 PROCEDURE — 85730 THROMBOPLASTIN TIME PARTIAL: CPT

## 2025-08-21 PROCEDURE — 2580000003 HC RX 258

## 2025-08-21 PROCEDURE — 80143 DRUG ASSAY ACETAMINOPHEN: CPT

## 2025-08-21 PROCEDURE — 2580000003 HC RX 258: Performed by: EMERGENCY MEDICINE

## 2025-08-21 RX ORDER — SODIUM CHLORIDE, SODIUM LACTATE, POTASSIUM CHLORIDE, CALCIUM CHLORIDE 600; 310; 30; 20 MG/100ML; MG/100ML; MG/100ML; MG/100ML
INJECTION, SOLUTION INTRAVENOUS ONCE
Status: COMPLETED | OUTPATIENT
Start: 2025-08-21 | End: 2025-08-21

## 2025-08-21 RX ORDER — SODIUM CHLORIDE, SODIUM LACTATE, POTASSIUM CHLORIDE, AND CALCIUM CHLORIDE .6; .31; .03; .02 G/100ML; G/100ML; G/100ML; G/100ML
1000 INJECTION, SOLUTION INTRAVENOUS ONCE
Status: DISCONTINUED | OUTPATIENT
Start: 2025-08-21 | End: 2025-08-21

## 2025-08-21 RX ORDER — IOPAMIDOL 755 MG/ML
75 INJECTION, SOLUTION INTRAVASCULAR
Status: COMPLETED | OUTPATIENT
Start: 2025-08-21 | End: 2025-08-21

## 2025-08-21 RX ORDER — ACETAMINOPHEN 325 MG/1
650 TABLET ORAL EVERY 4 HOURS PRN
Status: DISCONTINUED | OUTPATIENT
Start: 2025-08-21 | End: 2025-08-22 | Stop reason: HOSPADM

## 2025-08-21 RX ORDER — 0.9 % SODIUM CHLORIDE 0.9 %
2000 INTRAVENOUS SOLUTION INTRAVENOUS ONCE
Status: COMPLETED | OUTPATIENT
Start: 2025-08-21 | End: 2025-08-21

## 2025-08-21 RX ORDER — METHOCARBAMOL 750 MG/1
750 TABLET, FILM COATED ORAL 4 TIMES DAILY
Qty: 40 TABLET | Refills: 0 | Status: SHIPPED | OUTPATIENT
Start: 2025-08-21 | End: 2025-08-31

## 2025-08-21 RX ORDER — LIDOCAINE HYDROCHLORIDE AND EPINEPHRINE 10; 10 MG/ML; UG/ML
INJECTION, SOLUTION INFILTRATION; PERINEURAL
Status: COMPLETED
Start: 2025-08-21 | End: 2025-08-21

## 2025-08-21 RX ADMIN — SODIUM CHLORIDE 2000 ML: 0.9 INJECTION, SOLUTION INTRAVENOUS at 21:59

## 2025-08-21 RX ADMIN — ACETAMINOPHEN 650 MG: 325 TABLET ORAL at 20:44

## 2025-08-21 RX ADMIN — LIDOCAINE HYDROCHLORIDE,EPINEPHRINE BITARTRATE 20 ML: 10; .01 INJECTION, SOLUTION INFILTRATION; PERINEURAL at 16:02

## 2025-08-21 RX ADMIN — SODIUM CHLORIDE, SODIUM LACTATE, POTASSIUM CHLORIDE, AND CALCIUM CHLORIDE: .6; .31; .03; .02 INJECTION, SOLUTION INTRAVENOUS at 18:51

## 2025-08-21 RX ADMIN — IOPAMIDOL 75 ML: 755 INJECTION, SOLUTION INTRAVENOUS at 15:45

## 2025-08-21 RX ADMIN — POTASSIUM BICARBONATE 40 MEQ: 782 TABLET, EFFERVESCENT ORAL at 18:49

## 2025-08-21 ASSESSMENT — PAIN DESCRIPTION - DESCRIPTORS: DESCRIPTORS: ACHING;THROBBING

## 2025-08-21 ASSESSMENT — PAIN SCALES - GENERAL: PAINLEVEL_OUTOF10: 9

## 2025-08-21 ASSESSMENT — PAIN - FUNCTIONAL ASSESSMENT: PAIN_FUNCTIONAL_ASSESSMENT: 0-10

## 2025-08-21 ASSESSMENT — PAIN DESCRIPTION - LOCATION: LOCATION: HEAD

## 2025-08-22 LAB — LACTATE BLDV-SCNC: 3 MMOL/L (ref 0.5–2.2)

## (undated) DEVICE — SOLUTION IV IRRIG POUR BRL 0.9% SODIUM CHL 2F7124

## (undated) DEVICE — TOWEL,OR,DSP,ST,BLUE,STD,6/PK,12PK/CS: Brand: MEDLINE

## (undated) DEVICE — PADDING CAST W6INXL4YD COT LO LINTING WYTEX

## (undated) DEVICE — 4-PORT MANIFOLD: Brand: NEPTUNE 2

## (undated) DEVICE — INSTRUMENT SYSTEM 7 BATTERY REUSABLE

## (undated) DEVICE — BIT DRL L330MM DIA4.2MM CALIB L100MM ST 3 FLUT QUIK CPL FOR

## (undated) DEVICE — GUIDEWIRE ORTH L400MM DIA3.2MM FOR TFN

## (undated) DEVICE — Device

## (undated) DEVICE — GLOVE ORANGE PI 8 1/2   MSG9085

## (undated) DEVICE — DOUBLE BASIN SET: Brand: MEDLINE INDUSTRIES, INC.

## (undated) DEVICE — GLOVE SURG SZ 85 L12IN FNGR THK13MIL WHT ISOLEX POLYISOPRENE

## (undated) DEVICE — GLOVE SURG SZ 6 THK91MIL LTX FREE SYN POLYISOPRENE ANTI

## (undated) DEVICE — CLOTH SURG PREP PREOPERATIVE CHLORHEXIDINE GLUC 2% READYPREP

## (undated) DEVICE — DRESSING,GAUZE,XEROFORM,CURAD,1"X8",ST: Brand: CURAD

## (undated) DEVICE — NEEDLE SYR 18GA L1.5IN RED PLAS HUB S STL BLNT FILL W/O

## (undated) DEVICE — PADDING,UNDERCAST,COTTON, 4"X4YD STERILE: Brand: MEDLINE

## (undated) DEVICE — GOWN,SIRUS,FABRNF,XL,20/CS: Brand: MEDLINE

## (undated) DEVICE — CHLORAPREP 26ML ORANGE

## (undated) DEVICE — NEEDLE,25GX1.5",REG,BEVEL: Brand: MEDLINE

## (undated) DEVICE — ZIMMER® STERILE DISPOSABLE TOURNIQUET CUFF WITH PLC, DUAL PORT, SINGLE BLADDER, 18 IN. (46 CM)

## (undated) DEVICE — SHOECOVER ANTI-SKID: Brand: CARDINAL HEALTH

## (undated) DEVICE — TUBING, SUCTION, 9/32" X 10', STRAIGHT: Brand: MEDLINE

## (undated) DEVICE — SURGICAL PROCEDURE PACK HND

## (undated) DEVICE — BIT DRL L L266MM DIA16MM FEM FLX CANN QUIK CPL

## (undated) DEVICE — DRAPE C ARM W41XL74IN UNIV MOB W RUBBERBAND CLP

## (undated) DEVICE — TRAY DRILL SYSTEM 4 REUSABLE

## (undated) DEVICE — PAD PERINL POST FOAM DISPOSABLE FOR AMSCO SURG TBL

## (undated) DEVICE — TRAY SET HAND REUSABLE

## (undated) DEVICE — GLOVE SURG SZ 65 THK91MIL LTX FREE SYN POLYISOPRENE

## (undated) DEVICE — DRAPE CARM MINI FOR IMAG SYS INSIGHT FLROSCN

## (undated) DEVICE — SOLUTION IV IRRIG WATER 1000ML POUR BRL 2F7114

## (undated) DEVICE — INTENDED FOR TISSUE SEPARATION, AND OTHER PROCEDURES THAT REQUIRE A SHARP SURGICAL BLADE TO PUNCTURE OR CUT.: Brand: BARD-PARKER ® STAINLESS STEEL BLADES

## (undated) DEVICE — PACK PROCEDURE SURG GEN CUST

## (undated) DEVICE — SYRINGE 20ML LL S/C 50

## (undated) DEVICE — INSTRUMENT SYSTEM 4 BATTERY REUSABLE

## (undated) DEVICE — DRAPE ISOLATN PT ST W/POCKET

## (undated) DEVICE — GUIDEWIRE ORTHO 1.1X150 MM TROCAR PT 1 END

## (undated) DEVICE — SET ORTHO STD STORTSTD1

## (undated) DEVICE — GLOVE SURG SZ 9 THK91MIL LTX FREE SYN POLYISOPRENE ANTI

## (undated) DEVICE — TRAY SYSTEM 7 DRILL REUSABLE

## (undated) DEVICE — COVER HNDL LT DISP

## (undated) DEVICE — PADDING UNDERCAST W4INXL4YD COT FBR LO LINTING WYTEX

## (undated) DEVICE — BIT DRL L500MM DIA6X9MM CANN STP L QUIK CPL FOR DH DC TFN